# Patient Record
Sex: MALE | Race: WHITE | NOT HISPANIC OR LATINO | Employment: FULL TIME | URBAN - NONMETROPOLITAN AREA
[De-identification: names, ages, dates, MRNs, and addresses within clinical notes are randomized per-mention and may not be internally consistent; named-entity substitution may affect disease eponyms.]

---

## 2017-01-05 ENCOUNTER — TELEPHONE (OUTPATIENT)
Dept: INTERNAL MEDICINE | Facility: CLINIC | Age: 37
End: 2017-01-05

## 2017-01-05 RX ORDER — QUETIAPINE FUMARATE 25 MG/1
25 TABLET, FILM COATED ORAL NIGHTLY
Qty: 30 TABLET | Refills: 0 | Status: SHIPPED | OUTPATIENT
Start: 2017-01-05 | End: 2017-05-09 | Stop reason: SINTOL

## 2017-01-05 NOTE — TELEPHONE ENCOUNTER
----- Message from Shawna Holly MD sent at 1/5/2017  9:25 AM EST -----  Sent to [facility].  ----- Message -----     From: Lyn Truong MA     Sent: 1/4/2017   1:39 PM       To: Shawna Holly MD        ----- Message -----     From: Lucretia Shaw     Sent: 1/4/2017   1:04 PM       To: Lyn Truong MA    PATIENT WIFE CALLED AND STATED THAT THE TRAZODONE IS NOT WORKING AND WOULD LIKE SOMETHING ELSE CALLED IN.     [phone]

## 2017-04-19 ENCOUNTER — APPOINTMENT (OUTPATIENT)
Dept: GENERAL RADIOLOGY | Facility: HOSPITAL | Age: 37
End: 2017-04-19

## 2017-04-19 ENCOUNTER — HOSPITAL ENCOUNTER (OUTPATIENT)
Facility: HOSPITAL | Age: 37
Setting detail: OBSERVATION
Discharge: HOME | End: 2017-04-21
Attending: EMERGENCY MEDICINE | Admitting: INTERNAL MEDICINE

## 2017-04-19 DIAGNOSIS — E78.2 MIXED HYPERLIPIDEMIA: ICD-10-CM

## 2017-04-19 DIAGNOSIS — E78.5 HYPERLIPIDEMIA LDL GOAL <70: ICD-10-CM

## 2017-04-19 DIAGNOSIS — I25.110 CORONARY ARTERY DISEASE INVOLVING NATIVE CORONARY ARTERY OF NATIVE HEART WITH UNSTABLE ANGINA PECTORIS (HCC): Primary | ICD-10-CM

## 2017-04-19 DIAGNOSIS — R07.9 CHEST PAIN, UNSPECIFIED TYPE: ICD-10-CM

## 2017-04-19 DIAGNOSIS — E66.01 MORBID OBESITY, UNSPECIFIED OBESITY TYPE (HCC): ICD-10-CM

## 2017-04-19 DIAGNOSIS — Z72.0 TOBACCO ABUSE: ICD-10-CM

## 2017-04-19 LAB
ACT BLD: 363 SECONDS (ref 82–152)
ACT BLD: 363 SECONDS (ref 82–152)
ALBUMIN SERPL-MCNC: 4.7 G/DL (ref 3.2–4.8)
ALBUMIN/GLOB SERPL: 1.3 G/DL (ref 1.5–2.5)
ALP SERPL-CCNC: 54 U/L (ref 25–100)
ALT SERPL W P-5'-P-CCNC: 19 U/L (ref 7–40)
ANION GAP SERPL CALCULATED.3IONS-SCNC: 12 MMOL/L (ref 3–11)
ARTICHOKE IGE QN: 89 MG/DL (ref 0–130)
AST SERPL-CCNC: 26 U/L (ref 0–33)
BASOPHILS # BLD AUTO: 0.01 10*3/MM3 (ref 0–0.2)
BASOPHILS NFR BLD AUTO: 0.1 % (ref 0–1)
BILIRUB SERPL-MCNC: 0.7 MG/DL (ref 0.3–1.2)
BNP SERPL-MCNC: 146 PG/ML (ref 0–100)
BUN BLD-MCNC: 19 MG/DL (ref 9–23)
BUN/CREAT SERPL: 27.1 (ref 7–25)
CALCIUM SPEC-SCNC: 10.7 MG/DL (ref 8.7–10.4)
CHLORIDE SERPL-SCNC: 102 MMOL/L (ref 99–109)
CHOLEST SERPL-MCNC: 150 MG/DL (ref 0–200)
CO2 SERPL-SCNC: 29 MMOL/L (ref 20–31)
CREAT BLD-MCNC: 0.7 MG/DL (ref 0.6–1.3)
DEPRECATED RDW RBC AUTO: 43.4 FL (ref 37–54)
EOSINOPHIL # BLD AUTO: 0 10*3/MM3 (ref 0.1–0.3)
EOSINOPHIL NFR BLD AUTO: 0 % (ref 0–3)
ERYTHROCYTE [DISTWIDTH] IN BLOOD BY AUTOMATED COUNT: 13.3 % (ref 11.3–14.5)
GFR SERPL CREATININE-BSD FRML MDRD: 128 ML/MIN/1.73
GLOBULIN UR ELPH-MCNC: 3.5 GM/DL
GLUCOSE BLD-MCNC: 120 MG/DL (ref 70–100)
HBA1C MFR BLD: 5.4 % (ref 4.8–5.6)
HCT VFR BLD AUTO: 46.1 % (ref 38.9–50.9)
HDLC SERPL-MCNC: 35 MG/DL (ref 40–60)
HGB BLD-MCNC: 15.8 G/DL (ref 13.1–17.5)
HOLD SPECIMEN: NORMAL
HOLD SPECIMEN: NORMAL
IMM GRANULOCYTES # BLD: 0.04 10*3/MM3 (ref 0–0.03)
IMM GRANULOCYTES NFR BLD: 0.3 % (ref 0–0.6)
LIPASE SERPL-CCNC: 33 U/L (ref 6–51)
LYMPHOCYTES # BLD AUTO: 1.86 10*3/MM3 (ref 0.6–4.8)
LYMPHOCYTES NFR BLD AUTO: 12.6 % (ref 24–44)
MCH RBC QN AUTO: 30.7 PG (ref 27–31)
MCHC RBC AUTO-ENTMCNC: 34.3 G/DL (ref 32–36)
MCV RBC AUTO: 89.7 FL (ref 80–99)
MONOCYTES # BLD AUTO: 0.95 10*3/MM3 (ref 0–1)
MONOCYTES NFR BLD AUTO: 6.4 % (ref 0–12)
NEUTROPHILS # BLD AUTO: 11.95 10*3/MM3 (ref 1.5–8.3)
NEUTROPHILS NFR BLD AUTO: 80.6 % (ref 41–71)
PLATELET # BLD AUTO: 266 10*3/MM3 (ref 150–450)
PMV BLD AUTO: 10.1 FL (ref 6–12)
POTASSIUM BLD-SCNC: 4.1 MMOL/L (ref 3.5–5.5)
PROT SERPL-MCNC: 8.2 G/DL (ref 5.7–8.2)
RBC # BLD AUTO: 5.14 10*6/MM3 (ref 4.2–5.76)
SODIUM BLD-SCNC: 143 MMOL/L (ref 132–146)
TRIGL SERPL-MCNC: 119 MG/DL (ref 0–150)
TROPONIN I SERPL-MCNC: 0.01 NG/ML (ref 0–0.07)
TROPONIN I SERPL-MCNC: 0.03 NG/ML (ref 0–0.07)
WBC NRBC COR # BLD: 14.81 10*3/MM3 (ref 3.5–10.8)
WHOLE BLOOD HOLD SPECIMEN: NORMAL

## 2017-04-19 PROCEDURE — C1887 CATHETER, GUIDING: HCPCS | Performed by: INTERNAL MEDICINE

## 2017-04-19 PROCEDURE — 25010000002 FENTANYL CITRATE (PF) 100 MCG/2ML SOLUTION: Performed by: INTERNAL MEDICINE

## 2017-04-19 PROCEDURE — C1769 GUIDE WIRE: HCPCS | Performed by: INTERNAL MEDICINE

## 2017-04-19 PROCEDURE — 93458 L HRT ARTERY/VENTRICLE ANGIO: CPT | Performed by: INTERNAL MEDICINE

## 2017-04-19 PROCEDURE — 99220 PR INITIAL OBSERVATION CARE/DAY 70 MINUTES: CPT | Performed by: INTERNAL MEDICINE

## 2017-04-19 PROCEDURE — 25010000002 MORPHINE PER 10 MG: Performed by: PHYSICIAN ASSISTANT

## 2017-04-19 PROCEDURE — 0 IOPAMIDOL PER 1 ML: Performed by: INTERNAL MEDICINE

## 2017-04-19 PROCEDURE — 80061 LIPID PANEL: CPT | Performed by: PHYSICIAN ASSISTANT

## 2017-04-19 PROCEDURE — 25010000002 MIDAZOLAM PER 1 MG: Performed by: INTERNAL MEDICINE

## 2017-04-19 PROCEDURE — 71010 HC CHEST PA OR AP: CPT

## 2017-04-19 PROCEDURE — 83036 HEMOGLOBIN GLYCOSYLATED A1C: CPT | Performed by: EMERGENCY MEDICINE

## 2017-04-19 PROCEDURE — C1753 CATH, INTRAVAS ULTRASOUND: HCPCS | Performed by: INTERNAL MEDICINE

## 2017-04-19 PROCEDURE — 99284 EMERGENCY DEPT VISIT MOD MDM: CPT

## 2017-04-19 PROCEDURE — G0378 HOSPITAL OBSERVATION PER HR: HCPCS

## 2017-04-19 PROCEDURE — C1894 INTRO/SHEATH, NON-LASER: HCPCS | Performed by: INTERNAL MEDICINE

## 2017-04-19 PROCEDURE — 25010000002 ONDANSETRON PER 1 MG: Performed by: EMERGENCY MEDICINE

## 2017-04-19 PROCEDURE — 25010000002 HEPARIN (PORCINE) PER 1000 UNITS: Performed by: INTERNAL MEDICINE

## 2017-04-19 PROCEDURE — 84484 ASSAY OF TROPONIN QUANT: CPT

## 2017-04-19 PROCEDURE — 92978 ENDOLUMINL IVUS OCT C 1ST: CPT | Performed by: INTERNAL MEDICINE

## 2017-04-19 PROCEDURE — 85025 COMPLETE CBC W/AUTO DIFF WBC: CPT | Performed by: EMERGENCY MEDICINE

## 2017-04-19 PROCEDURE — 85347 COAGULATION TIME ACTIVATED: CPT

## 2017-04-19 PROCEDURE — 80053 COMPREHEN METABOLIC PANEL: CPT | Performed by: EMERGENCY MEDICINE

## 2017-04-19 PROCEDURE — 83880 ASSAY OF NATRIURETIC PEPTIDE: CPT | Performed by: EMERGENCY MEDICINE

## 2017-04-19 PROCEDURE — 93005 ELECTROCARDIOGRAM TRACING: CPT | Performed by: INTERNAL MEDICINE

## 2017-04-19 PROCEDURE — 25010000002 MORPHINE PER 10 MG: Performed by: EMERGENCY MEDICINE

## 2017-04-19 PROCEDURE — 93005 ELECTROCARDIOGRAM TRACING: CPT | Performed by: EMERGENCY MEDICINE

## 2017-04-19 PROCEDURE — 96374 THER/PROPH/DIAG INJ IV PUSH: CPT

## 2017-04-19 PROCEDURE — 83690 ASSAY OF LIPASE: CPT | Performed by: EMERGENCY MEDICINE

## 2017-04-19 RX ORDER — ASPIRIN 81 MG/1
81 TABLET ORAL DAILY
Status: DISCONTINUED | OUTPATIENT
Start: 2017-04-20 | End: 2017-04-19 | Stop reason: SDUPTHER

## 2017-04-19 RX ORDER — PANTOPRAZOLE SODIUM 40 MG/1
40 TABLET, DELAYED RELEASE ORAL
Status: DISCONTINUED | OUTPATIENT
Start: 2017-04-20 | End: 2017-04-21 | Stop reason: HOSPADM

## 2017-04-19 RX ORDER — NITROGLYCERIN 5 MG/ML
INJECTION, SOLUTION INTRAVENOUS AS NEEDED
Status: DISCONTINUED | OUTPATIENT
Start: 2017-04-19 | End: 2017-04-19 | Stop reason: HOSPADM

## 2017-04-19 RX ORDER — MAGNESIUM HYDROXIDE/ALUMINUM HYDROXICE/SIMETHICONE 120; 1200; 1200 MG/30ML; MG/30ML; MG/30ML
30 SUSPENSION ORAL ONCE
Status: COMPLETED | OUTPATIENT
Start: 2017-04-19 | End: 2017-04-19

## 2017-04-19 RX ORDER — HYDROCODONE BITARTRATE AND ACETAMINOPHEN 7.5; 325 MG/1; MG/1
1 TABLET ORAL EVERY 4 HOURS PRN
Status: DISCONTINUED | OUTPATIENT
Start: 2017-04-19 | End: 2017-04-21 | Stop reason: HOSPADM

## 2017-04-19 RX ORDER — ONDANSETRON 2 MG/ML
4 INJECTION INTRAMUSCULAR; INTRAVENOUS ONCE
Status: COMPLETED | OUTPATIENT
Start: 2017-04-19 | End: 2017-04-19

## 2017-04-19 RX ORDER — MORPHINE SULFATE 4 MG/ML
4 INJECTION, SOLUTION INTRAMUSCULAR; INTRAVENOUS ONCE
Status: COMPLETED | OUTPATIENT
Start: 2017-04-19 | End: 2017-04-19

## 2017-04-19 RX ORDER — PRASUGREL 10 MG/1
10 TABLET, FILM COATED ORAL DAILY
Status: DISCONTINUED | OUTPATIENT
Start: 2017-04-20 | End: 2017-04-19 | Stop reason: SDUPTHER

## 2017-04-19 RX ORDER — SODIUM CHLORIDE 0.9 % (FLUSH) 0.9 %
10 SYRINGE (ML) INJECTION AS NEEDED
Status: DISCONTINUED | OUTPATIENT
Start: 2017-04-19 | End: 2017-04-21 | Stop reason: HOSPADM

## 2017-04-19 RX ORDER — LIDOCAINE HYDROCHLORIDE 10 MG/ML
INJECTION, SOLUTION INFILTRATION; PERINEURAL AS NEEDED
Status: DISCONTINUED | OUTPATIENT
Start: 2017-04-19 | End: 2017-04-19 | Stop reason: HOSPADM

## 2017-04-19 RX ORDER — PRASUGREL 10 MG/1
10 TABLET, FILM COATED ORAL DAILY
Status: DISCONTINUED | OUTPATIENT
Start: 2017-04-19 | End: 2017-04-21 | Stop reason: HOSPADM

## 2017-04-19 RX ORDER — ASPIRIN 81 MG/1
324 TABLET, CHEWABLE ORAL ONCE
Status: DISCONTINUED | OUTPATIENT
Start: 2017-04-19 | End: 2017-04-19

## 2017-04-19 RX ORDER — NITROGLYCERIN 0.4 MG/1
0.4 TABLET SUBLINGUAL
Status: DISCONTINUED | OUTPATIENT
Start: 2017-04-19 | End: 2017-04-21 | Stop reason: HOSPADM

## 2017-04-19 RX ORDER — HEPARIN SODIUM 1000 [USP'U]/ML
INJECTION, SOLUTION INTRAVENOUS; SUBCUTANEOUS AS NEEDED
Status: DISCONTINUED | OUTPATIENT
Start: 2017-04-19 | End: 2017-04-19 | Stop reason: HOSPADM

## 2017-04-19 RX ORDER — SODIUM CHLORIDE 9 MG/ML
1.5 INJECTION, SOLUTION INTRAVENOUS CONTINUOUS
Status: ACTIVE | OUTPATIENT
Start: 2017-04-19 | End: 2017-04-19

## 2017-04-19 RX ORDER — METOPROLOL SUCCINATE 25 MG/1
25 TABLET, EXTENDED RELEASE ORAL
Status: DISCONTINUED | OUTPATIENT
Start: 2017-04-19 | End: 2017-04-21 | Stop reason: HOSPADM

## 2017-04-19 RX ORDER — NICARDIPINE HYDROCHLORIDE 2.5 MG/ML
INJECTION INTRAVENOUS AS NEEDED
Status: DISCONTINUED | OUTPATIENT
Start: 2017-04-19 | End: 2017-04-19 | Stop reason: HOSPADM

## 2017-04-19 RX ORDER — ATORVASTATIN CALCIUM 40 MG/1
80 TABLET, FILM COATED ORAL NIGHTLY
Status: DISCONTINUED | OUTPATIENT
Start: 2017-04-19 | End: 2017-04-21 | Stop reason: HOSPADM

## 2017-04-19 RX ORDER — FENTANYL CITRATE 50 UG/ML
INJECTION, SOLUTION INTRAMUSCULAR; INTRAVENOUS AS NEEDED
Status: DISCONTINUED | OUTPATIENT
Start: 2017-04-19 | End: 2017-04-19 | Stop reason: HOSPADM

## 2017-04-19 RX ORDER — QUETIAPINE FUMARATE 25 MG/1
25 TABLET, FILM COATED ORAL NIGHTLY
Status: DISCONTINUED | OUTPATIENT
Start: 2017-04-19 | End: 2017-04-21 | Stop reason: HOSPADM

## 2017-04-19 RX ORDER — TIZANIDINE 4 MG/1
4 TABLET ORAL EVERY 8 HOURS PRN
Status: DISCONTINUED | OUTPATIENT
Start: 2017-04-19 | End: 2017-04-21 | Stop reason: HOSPADM

## 2017-04-19 RX ORDER — GABAPENTIN 300 MG/1
300 CAPSULE ORAL EVERY 8 HOURS SCHEDULED
Status: DISCONTINUED | OUTPATIENT
Start: 2017-04-19 | End: 2017-04-21 | Stop reason: HOSPADM

## 2017-04-19 RX ORDER — MIDAZOLAM HYDROCHLORIDE 1 MG/ML
INJECTION INTRAMUSCULAR; INTRAVENOUS AS NEEDED
Status: DISCONTINUED | OUTPATIENT
Start: 2017-04-19 | End: 2017-04-19 | Stop reason: HOSPADM

## 2017-04-19 RX ORDER — ASPIRIN 81 MG/1
81 TABLET ORAL DAILY
Status: DISCONTINUED | OUTPATIENT
Start: 2017-04-20 | End: 2017-04-21 | Stop reason: HOSPADM

## 2017-04-19 RX ORDER — LISINOPRIL 2.5 MG/1
2.5 TABLET ORAL DAILY
Status: DISCONTINUED | OUTPATIENT
Start: 2017-04-19 | End: 2017-04-21 | Stop reason: HOSPADM

## 2017-04-19 RX ADMIN — TIZANIDINE 4 MG: 4 TABLET ORAL at 21:37

## 2017-04-19 RX ADMIN — GABAPENTIN 300 MG: 300 CAPSULE ORAL at 17:49

## 2017-04-19 RX ADMIN — MORPHINE SULFATE 4 MG: 4 INJECTION, SOLUTION INTRAMUSCULAR; INTRAVENOUS at 13:00

## 2017-04-19 RX ADMIN — METOPROLOL SUCCINATE 25 MG: 25 TABLET, EXTENDED RELEASE ORAL at 17:49

## 2017-04-19 RX ADMIN — ALUMINUM HYDROXIDE, MAGNESIUM HYDROXIDE, AND SIMETHICONE 30 ML: 200; 200; 20 SUSPENSION ORAL at 12:16

## 2017-04-19 RX ADMIN — LIDOCAINE HYDROCHLORIDE 15 ML: 20 SOLUTION ORAL; TOPICAL at 12:16

## 2017-04-19 RX ADMIN — ONDANSETRON 4 MG: 2 INJECTION INTRAMUSCULAR; INTRAVENOUS at 12:16

## 2017-04-19 RX ADMIN — SODIUM CHLORIDE 1.5 ML/KG/HR: 9 INJECTION, SOLUTION INTRAVENOUS at 16:35

## 2017-04-19 RX ADMIN — HYDROCODONE BITARTRATE AND ACETAMINOPHEN 1 TABLET: 7.5; 325 TABLET ORAL at 21:37

## 2017-04-19 RX ADMIN — PRASUGREL HYDROCHLORIDE 10 MG: 10 TABLET, FILM COATED ORAL at 14:27

## 2017-04-19 RX ADMIN — QUETIAPINE FUMARATE 25 MG: 25 TABLET, FILM COATED ORAL at 21:36

## 2017-04-19 RX ADMIN — NITROGLYCERIN 0.4 MG: 0.4 TABLET SUBLINGUAL at 12:45

## 2017-04-19 RX ADMIN — ASPIRIN 81 MG: 81 TABLET, COATED ORAL at 14:28

## 2017-04-19 RX ADMIN — HYDROCODONE BITARTRATE AND ACETAMINOPHEN 1 TABLET: 7.5; 325 TABLET ORAL at 17:50

## 2017-04-19 RX ADMIN — LISINOPRIL 2.5 MG: 2.5 TABLET ORAL at 17:49

## 2017-04-19 RX ADMIN — MORPHINE SULFATE 4 MG: 4 INJECTION, SOLUTION INTRAMUSCULAR; INTRAVENOUS at 21:55

## 2017-04-19 RX ADMIN — ATORVASTATIN CALCIUM 80 MG: 40 TABLET, FILM COATED ORAL at 21:36

## 2017-04-19 RX ADMIN — GABAPENTIN 300 MG: 300 CAPSULE ORAL at 21:37

## 2017-04-19 RX ADMIN — NITROGLYCERIN 0.4 MG: 0.4 TABLET SUBLINGUAL at 12:16

## 2017-04-19 NOTE — ED PROVIDER NOTES
Subjective   HPI Comments: PT WITH HX OF CAD C/O ANTERIOR CHEST PAIN LIKE PAIN HE HAS HAD WITH HIS PAST MI. PT DENIES CHILLS, FEVER, TRAUMA OR DIARRHEA. PT HAS HAD SOME VOMITING.     Patient is a 36 y.o. male presenting with chest pain.   History provided by:  Patient  Chest Pain   Pain quality: dull    Pain radiates to:  Neck and L jaw  Pain severity:  Moderate  Onset quality:  Sudden  Timing:  Constant  Chronicity:  New  Context: not breathing    Relieved by:  Nothing  Worsened by:  Nothing  Ineffective treatments:  None tried  Associated symptoms: no abdominal pain, no headache and no palpitations        Review of Systems   Cardiovascular: Positive for chest pain. Negative for palpitations.   Gastrointestinal: Negative for abdominal pain.   Neurological: Negative for headaches.   All other systems reviewed and are negative.      Past Medical History:   Diagnosis Date   • Cholelithiasis    • Coronary artery disease    • Diverticulosis    • Elevated homocysteine    • Fractures    • Gall stones    • Heart attack 06/20/2016   • Heart attack    • Kidney stone    • Kidney stones    • Myocardial infarct 06/2016    stent x 2   • Obesity    • Shingles 2015       Allergies   Allergen Reactions   • Wasp Venom Swelling     Local swelling       Past Surgical History:   Procedure Laterality Date   • APPENDECTOMY  2007   • CARDIAC CATHETERIZATION     • CARDIAC CATHETERIZATION  06/2016    Xience Alpine stent in diagonal and LAD   • CHOLECYSTECTOMY  2005   • CHOLECYSTECTOMY  2007   • COLONOSCOPY  2014   • CORONARY STENT PLACEMENT  06/20/2016    x2   • CORONARY STENT PLACEMENT  06/20/2016    2 stents   • FINGER FUSION Left     thumb   • FINGER SURGERY Left 2005    LEFT THUMB FUSED due to dislocations   • HERNIA REPAIR  2013   • HERNIA REPAIR  2014   • KIDNEY STONE SURGERY      STONES REMOVED 2006, 2007, 2008       Family History   Problem Relation Age of Onset   • Arthritis Mother    • Heart attack Mother      30s   • Heart attack  Father      30s   • Hypertension Father    • Heart attack Brother      30s   • Cancer Maternal Aunt    • Cancer Maternal Uncle    • Cancer Paternal Uncle    • Diabetes Mother    • Diabetes Father        Social History     Social History   • Marital status:      Spouse name: N/A   • Number of children: N/A   • Years of education: N/A     Social History Main Topics   • Smoking status: Former Smoker     Packs/day: 1.00     Years: 17.00     Types: Cigarettes     Quit date: 7/15/2016   • Smokeless tobacco: None   • Alcohol use Yes      Comment: OCCASIONAL   • Drug use: No   • Sexual activity: Not Asked     Other Topics Concern   • None     Social History Narrative    ** Merged History Encounter **                Objective   Physical Exam   Constitutional: He appears well-developed and well-nourished.   HENT:   Head: Normocephalic and atraumatic.   Eyes: Conjunctivae are normal.   Neck: Normal range of motion. Neck supple.   Cardiovascular: Normal rate, regular rhythm and normal heart sounds.    Pulmonary/Chest: Effort normal and breath sounds normal. No respiratory distress.   Abdominal: Soft. Bowel sounds are normal. He exhibits no distension.   Musculoskeletal: Normal range of motion. He exhibits no edema.   Neurological: He is alert.   Psychiatric: He has a normal mood and affect. His behavior is normal. Judgment and thought content normal.   Nursing note and vitals reviewed.      Procedures         ED Course  ED Course   Comment By Time   SPOKE WITH HOPE AND CONSULT PLACED IN. NEERAJ Browne 04/19 1253   DR. FOX WILL TAKE PT TO CATH LAB AND HAS SEEN THE PT. NEERAJ Browne 04/19 1432          Recent Results (from the past 24 hour(s))   BNP    Collection Time: 04/19/17 10:28 AM   Result Value Ref Range    .0 (H) 0.0 - 100.0 pg/mL   Lavender Top    Collection Time: 04/19/17 10:28 AM   Result Value Ref Range    Extra Tube hold for add-on    CBC Auto Differential    Collection Time: 04/19/17 10:28 AM    Result Value Ref Range    WBC 14.81 (H) 3.50 - 10.80 10*3/mm3    RBC 5.14 4.20 - 5.76 10*6/mm3    Hemoglobin 15.8 13.1 - 17.5 g/dL    Hematocrit 46.1 38.9 - 50.9 %    MCV 89.7 80.0 - 99.0 fL    MCH 30.7 27.0 - 31.0 pg    MCHC 34.3 32.0 - 36.0 g/dL    RDW 13.3 11.3 - 14.5 %    RDW-SD 43.4 37.0 - 54.0 fl    MPV 10.1 6.0 - 12.0 fL    Platelets 266 150 - 450 10*3/mm3    Neutrophil % 80.6 (H) 41.0 - 71.0 %    Lymphocyte % 12.6 (L) 24.0 - 44.0 %    Monocyte % 6.4 0.0 - 12.0 %    Eosinophil % 0.0 0.0 - 3.0 %    Basophil % 0.1 0.0 - 1.0 %    Immature Grans % 0.3 0.0 - 0.6 %    Neutrophils, Absolute 11.95 (H) 1.50 - 8.30 10*3/mm3    Lymphocytes, Absolute 1.86 0.60 - 4.80 10*3/mm3    Monocytes, Absolute 0.95 0.00 - 1.00 10*3/mm3    Eosinophils, Absolute 0.00 (L) 0.10 - 0.30 10*3/mm3    Basophils, Absolute 0.01 0.00 - 0.20 10*3/mm3    Immature Grans, Absolute 0.04 (H) 0.00 - 0.03 10*3/mm3   Comprehensive Metabolic Panel    Collection Time: 04/19/17 10:29 AM   Result Value Ref Range    Glucose 120 (H) 70 - 100 mg/dL    BUN 19 9 - 23 mg/dL    Creatinine 0.70 0.60 - 1.30 mg/dL    Sodium 143 132 - 146 mmol/L    Potassium 4.1 3.5 - 5.5 mmol/L    Chloride 102 99 - 109 mmol/L    CO2 29.0 20.0 - 31.0 mmol/L    Calcium 10.7 (H) 8.7 - 10.4 mg/dL    Total Protein 8.2 5.7 - 8.2 g/dL    Albumin 4.70 3.20 - 4.80 g/dL    ALT (SGPT) 19 7 - 40 U/L    AST (SGOT) 26 0 - 33 U/L    Alkaline Phosphatase 54 25 - 100 U/L    Total Bilirubin 0.7 0.3 - 1.2 mg/dL    eGFR Non African Amer 128 >60 mL/min/1.73    Globulin 3.5 gm/dL    A/G Ratio 1.3 (L) 1.5 - 2.5 g/dL    BUN/Creatinine Ratio 27.1 (H) 7.0 - 25.0    Anion Gap 12.0 (H) 3.0 - 11.0 mmol/L   Lipase    Collection Time: 04/19/17 10:29 AM   Result Value Ref Range    Lipase 33 6 - 51 U/L   Green Top (Gel)    Collection Time: 04/19/17 10:29 AM   Result Value Ref Range    Extra Tube Hold for add-ons.    Gold Top - SST    Collection Time: 04/19/17 10:29 AM   Result Value Ref Range    Extra Tube Hold  for add-ons.    Lipid Panel    Collection Time: 04/19/17 10:29 AM   Result Value Ref Range    Total Cholesterol 150 0 - 200 mg/dL    Triglycerides 119 0 - 150 mg/dL    HDL Cholesterol 35 (L) 40 - 60 mg/dL    LDL Cholesterol  89 0 - 130 mg/dL   POC Troponin, Rapid    Collection Time: 04/19/17 10:32 AM   Result Value Ref Range    Troponin I 0.03 0.00 - 0.07 ng/mL   POC Troponin, Rapid    Collection Time: 04/19/17  1:17 PM   Result Value Ref Range    Troponin I 0.01 0.00 - 0.07 ng/mL     Note: In addition to lab results from this visit, the labs listed above may include labs taken at another facility or during a different encounter within the last 24 hours. Please correlate lab times with ED admission and discharge times for further clarification of the services performed during this visit.    XR Chest 1 View   Preliminary Result   Mild nonspecific basilar interstitial disease, probably   chronic. Asymmetric interstitial edema is considered less likely. No   other evidence of active chest disease.       D:  04/19/2017   E:  04/19/2017                Vitals:    04/19/17 1003 04/19/17 1145 04/19/17 1230 04/19/17 1330   BP:  125/69 129/64 112/58   BP Location:  Right arm     Patient Position:  Lying     Pulse: 84 61     Resp:  18     Temp:       TempSrc:       SpO2: 97% 98% 97% 97%   Weight:       Height:         Medications   sodium chloride 0.9 % flush 10 mL (not administered)   aspirin chewable tablet 324 mg (324 mg Oral Not Given 4/19/17 1042)   nitroglycerin (NITROSTAT) SL tablet 0.4 mg (0.4 mg Sublingual Given 4/19/17 1245)   aspirin EC tablet 81 mg (81 mg Oral Given 4/19/17 1428)   prasugrel (EFFIENT) tablet 10 mg (10 mg Oral Given 4/19/17 1427)   ondansetron (ZOFRAN) injection 4 mg (4 mg Intravenous Given 4/19/17 1216)   aluminum-magnesium hydroxide-simethicone (MAALOX/MYLANTA) suspension 30 mL (30 mL Oral Given 4/19/17 1216)   lidocaine viscous (XYLOCAINE) 2 % mouth solution 15 mL (15 mL Mouth/Throat Given  4/19/17 1216)   Morphine sulfate (PF) injection 4 mg (4 mg Intravenous Given 4/19/17 1300)     ECG/EMG Results (last 24 hours)     Procedure Component Value Units Date/Time    ECG 12 Lead [[id number]] Collected:  04/19/17 1000     Updated:  04/19/17 1048    ECG 12 Lead [[id number]] Collected:  04/19/17 1309     Updated:  04/19/17 1326              MDM    Final diagnoses:   Coronary artery disease involving native coronary artery of native heart with unstable angina pectoris   Mixed hyperlipidemia   Tobacco abuse   Morbid obesity, unspecified obesity type            NEERAJ Browne  04/19/17 1431       NEERAJ Browne  04/19/17 1433

## 2017-04-19 NOTE — ED NOTES
CALLED [facility] AGAIN. SPOKE WITH MEDICAL RECORDS, STATED THEY'VE BEEN EXPERIENCING FAX ISSUES. THEY ARE RE-FAXING RECORDS NOW. AWAITING FAX.       Sdae Flor  04/19/17 8735

## 2017-04-19 NOTE — H&P
"[facility] at [facility]  CARDIOLOGY HISTORY AND PHYSICAL NOTE    Date of Admission:4/19/2017    PCP: Shawna Holly MD  Previous Cardiologist:  Albert Childers    IDENTIFICATION: A 36 y.o. white male resident of [place], [place]            The patient is a 36-year-old gentleman with a history of precocious coronary artery disease who sustained a myocardial infarction in August 2016.  At that time, he was treated by Dr. Albert Childers in [place] with PCI.  Since this MI, the patient has had issues with refractory chest pain.  Dr. Childers repeated heart catheterization approximately one month after his MI.  It's unclear whether the patient had further PCI at that time.  Nevertheless, the patient comes in today after developing severe, sharp, left sided chest pain at approximately 4:30 AM.  The pain has been constant and has been radiating to his left jaw and left arm.  In the emergency room, the patient has ruled out for myocardial infarction with negative troponin and EKGs.  The patient continues to have severe chest pain symptoms.  The patient is desiring to establish care with a new cardiologist.    ROS: All systems have been reviewed and are negative with the exception of those mentioned in the HPI and problem list above.    The past medical/surgical, and social histories noted in Epic.                  /61  Pulse 61  Temp 98.2 °F (36.8 °C) (Oral)   Resp 18  Ht 71\" (180.3 cm)  Wt (!) 315 lb (143 kg)  SpO2 97%  BMI 43.93 kg/m2  No intake or output data in the 24 hours ending 04/19/17 1504      PHYSICAL EXAM:  Constitutional:  Obese, cooperative, in acute distress.   Head:  Normocephalic, without obvious abnormality, atraumatic.   Neck: No adenopathy, supple, trachea midline, no thyromegaly   Respiratory:   Clear to auscultation bilaterally; respirations regular, even and unlabored. No wheezes, rales or ronchi.    Cardiovascular:  Regular rhythm and normal rate, normal S1 and " S2, no            murmur, no gallop, no rub, no click.   Pulses: Peripheral pulses are present and equal bilaterally.   GI:   Soft, non-distended. Bowel sounds heard throughout.    Extremities: No edema, clubbing or cyanosis.   Skin: Skin is warm and dry. No bleeding, bruising or rash.   Neurological: Alert, oriented to time, person and place. No focal deficits.     Labs/Diagnostic Data    Results from last 7 days  Lab Units 04/19/17  1029   SODIUM mmol/L 143   POTASSIUM mmol/L 4.1   CHLORIDE mmol/L 102   TOTAL CO2 mmol/L 29.0   BUN mg/dL 19   CREATININE mg/dL 0.70   GLUCOSE mg/dL 120*   CALCIUM mg/dL 10.7*           Results from last 7 days  Lab Units 04/19/17  1028   WBC 10*3/mm3 14.81*   HEMOGLOBIN g/dL 15.8   HEMATOCRIT % 46.1   PLATELETS 10*3/mm3 266     Troponin: 0.03, 0.01  BNP: 146    EKG: NSR at 63 bpm, anterior infarct, age undetermined    Radiology Data:   CXR 4/19/17: No acute processes.  No CHF.                Hospital Problem List     * (Principal)Coronary artery disease involving native coronary artery of native heart with unstable angina pectoris    Overview Signed 4/19/2017  2:53 PM by Renny Wilson MD     · Cardiac catheterization for MI by Albert Childers (8/2016):  PCI to unknown artery  · Repeat cardiac catheterization for recurrent chest pain, 9/2016  · [facility] ED presentation with severe persistent chest pain with negative EKG and biomarkers, 4/19/17  · Cardiac catheterization (4/19/17):         Hyperlipidemia LDL goal <70    Tobacco abuse                    · Cardiac catheterization with possible PCI via the left radial approach  · Further recommendations to follow      Scribed for Brandon Wilson MD by Dilcia Akers PA-C. 4/19/2017  3:04 PM      I have evaluated and examined the patient.  He is a 36-year-old with known coronary artery disease who is a been experiencing persistent refractory chest pain since his myocardial infarction in August 2016.  He presents today with an acute  exacerbation of the chest pain and has ruled out for myocardial infarction.  However, in the emergency room he continues to be distressed.  I am recommending that the patient undergo cardiac catheterization to define his anatomy and rule out obstructive disease as a cause of symptoms.  If no obstructive disease is appreciated, patient will benefit from GI evaluation and possible upper endoscopy.    Renny Wilson MD  4/19/2017

## 2017-04-20 PROBLEM — R07.9 CHEST PAIN: Status: RESOLVED | Noted: 2017-04-19 | Resolved: 2017-04-20

## 2017-04-20 LAB
ACT BLD: 384 SECONDS (ref 82–152)
ACT BLD: 590 SECONDS (ref 82–152)

## 2017-04-20 PROCEDURE — 0 IOPAMIDOL PER 1 ML: Performed by: INTERNAL MEDICINE

## 2017-04-20 PROCEDURE — C1725 CATH, TRANSLUMIN NON-LASER: HCPCS | Performed by: INTERNAL MEDICINE

## 2017-04-20 PROCEDURE — C1760 CLOSURE DEV, VASC: HCPCS | Performed by: INTERNAL MEDICINE

## 2017-04-20 PROCEDURE — G0378 HOSPITAL OBSERVATION PER HR: HCPCS

## 2017-04-20 PROCEDURE — C1894 INTRO/SHEATH, NON-LASER: HCPCS | Performed by: INTERNAL MEDICINE

## 2017-04-20 PROCEDURE — 92978 ENDOLUMINL IVUS OCT C 1ST: CPT | Performed by: INTERNAL MEDICINE

## 2017-04-20 PROCEDURE — 92921 PR PRQ TRLUML CORONARY ANGIOPLASTY ADDL BRANCH: CPT | Performed by: INTERNAL MEDICINE

## 2017-04-20 PROCEDURE — 25010000002 MIDAZOLAM PER 1 MG: Performed by: INTERNAL MEDICINE

## 2017-04-20 PROCEDURE — 99204 OFFICE O/P NEW MOD 45 MIN: CPT | Performed by: INTERNAL MEDICINE

## 2017-04-20 PROCEDURE — 25010000002 HYDROMORPHONE PER 4 MG: Performed by: INTERNAL MEDICINE

## 2017-04-20 PROCEDURE — 25010000002 MORPHINE PER 10 MG: Performed by: INTERNAL MEDICINE

## 2017-04-20 PROCEDURE — 92921: CPT | Performed by: INTERNAL MEDICINE

## 2017-04-20 PROCEDURE — 92920 PRQ TRLUML C ANGIOP 1ART&/BR: CPT | Performed by: INTERNAL MEDICINE

## 2017-04-20 PROCEDURE — C1769 GUIDE WIRE: HCPCS | Performed by: INTERNAL MEDICINE

## 2017-04-20 PROCEDURE — 85347 COAGULATION TIME ACTIVATED: CPT

## 2017-04-20 PROCEDURE — 25010000002 HEPARIN (PORCINE) PER 1000 UNITS: Performed by: INTERNAL MEDICINE

## 2017-04-20 PROCEDURE — C1753 CATH, INTRAVAS ULTRASOUND: HCPCS | Performed by: INTERNAL MEDICINE

## 2017-04-20 PROCEDURE — 25010000002 FENTANYL CITRATE (PF) 100 MCG/2ML SOLUTION: Performed by: INTERNAL MEDICINE

## 2017-04-20 PROCEDURE — 25010000002 ONDANSETRON PER 1 MG: Performed by: NURSE PRACTITIONER

## 2017-04-20 PROCEDURE — C1887 CATHETER, GUIDING: HCPCS | Performed by: INTERNAL MEDICINE

## 2017-04-20 RX ORDER — ONDANSETRON 2 MG/ML
4 INJECTION INTRAMUSCULAR; INTRAVENOUS EVERY 6 HOURS PRN
Status: DISCONTINUED | OUTPATIENT
Start: 2017-04-20 | End: 2017-04-21 | Stop reason: HOSPADM

## 2017-04-20 RX ORDER — MORPHINE SULFATE 4 MG/ML
4 INJECTION, SOLUTION INTRAMUSCULAR; INTRAVENOUS ONCE
Status: COMPLETED | OUTPATIENT
Start: 2017-04-20 | End: 2017-04-20

## 2017-04-20 RX ORDER — HYDROMORPHONE HYDROCHLORIDE 1 MG/ML
0.5 INJECTION, SOLUTION INTRAMUSCULAR; INTRAVENOUS; SUBCUTANEOUS
Status: DISCONTINUED | OUTPATIENT
Start: 2017-04-20 | End: 2017-04-20

## 2017-04-20 RX ORDER — SODIUM CHLORIDE 9 MG/ML
1.5 INJECTION, SOLUTION INTRAVENOUS CONTINUOUS
Status: ACTIVE | OUTPATIENT
Start: 2017-04-20 | End: 2017-04-20

## 2017-04-20 RX ORDER — NITROGLYCERIN 5 MG/ML
INJECTION, SOLUTION INTRAVENOUS AS NEEDED
Status: DISCONTINUED | OUTPATIENT
Start: 2017-04-20 | End: 2017-04-20 | Stop reason: HOSPADM

## 2017-04-20 RX ORDER — HEPARIN SODIUM 1000 [USP'U]/ML
INJECTION, SOLUTION INTRAVENOUS; SUBCUTANEOUS AS NEEDED
Status: DISCONTINUED | OUTPATIENT
Start: 2017-04-20 | End: 2017-04-20 | Stop reason: HOSPADM

## 2017-04-20 RX ORDER — FENTANYL CITRATE 50 UG/ML
INJECTION, SOLUTION INTRAMUSCULAR; INTRAVENOUS AS NEEDED
Status: DISCONTINUED | OUTPATIENT
Start: 2017-04-20 | End: 2017-04-20 | Stop reason: HOSPADM

## 2017-04-20 RX ORDER — MIDAZOLAM HYDROCHLORIDE 1 MG/ML
INJECTION INTRAMUSCULAR; INTRAVENOUS AS NEEDED
Status: DISCONTINUED | OUTPATIENT
Start: 2017-04-20 | End: 2017-04-20 | Stop reason: HOSPADM

## 2017-04-20 RX ORDER — LIDOCAINE HYDROCHLORIDE 10 MG/ML
INJECTION, SOLUTION INFILTRATION; PERINEURAL AS NEEDED
Status: DISCONTINUED | OUTPATIENT
Start: 2017-04-20 | End: 2017-04-20 | Stop reason: HOSPADM

## 2017-04-20 RX ADMIN — HYDROMORPHONE HYDROCHLORIDE 1 MG: 1 INJECTION, SOLUTION INTRAMUSCULAR; INTRAVENOUS; SUBCUTANEOUS at 17:08

## 2017-04-20 RX ADMIN — HYDROMORPHONE HYDROCHLORIDE 1 MG: 1 INJECTION, SOLUTION INTRAMUSCULAR; INTRAVENOUS; SUBCUTANEOUS at 13:50

## 2017-04-20 RX ADMIN — ATORVASTATIN CALCIUM 80 MG: 40 TABLET, FILM COATED ORAL at 20:51

## 2017-04-20 RX ADMIN — GABAPENTIN 300 MG: 300 CAPSULE ORAL at 05:36

## 2017-04-20 RX ADMIN — Medication: at 13:02

## 2017-04-20 RX ADMIN — METOPROLOL SUCCINATE 25 MG: 25 TABLET, EXTENDED RELEASE ORAL at 08:04

## 2017-04-20 RX ADMIN — SODIUM CHLORIDE 1.5 ML/KG/HR: 9 INJECTION, SOLUTION INTRAVENOUS at 13:14

## 2017-04-20 RX ADMIN — PRASUGREL HYDROCHLORIDE 10 MG: 10 TABLET, FILM COATED ORAL at 08:04

## 2017-04-20 RX ADMIN — MORPHINE SULFATE 4 MG: 4 INJECTION, SOLUTION INTRAMUSCULAR; INTRAVENOUS at 02:24

## 2017-04-20 RX ADMIN — ASPIRIN 81 MG: 81 TABLET, COATED ORAL at 08:04

## 2017-04-20 RX ADMIN — ONDANSETRON 4 MG: 2 INJECTION INTRAMUSCULAR; INTRAVENOUS at 13:23

## 2017-04-20 RX ADMIN — HYDROCODONE BITARTRATE AND ACETAMINOPHEN 1 TABLET: 7.5; 325 TABLET ORAL at 05:38

## 2017-04-20 RX ADMIN — PANTOPRAZOLE SODIUM 40 MG: 40 TABLET, DELAYED RELEASE ORAL at 05:36

## 2017-04-20 RX ADMIN — QUETIAPINE FUMARATE 25 MG: 25 TABLET, FILM COATED ORAL at 20:51

## 2017-04-20 RX ADMIN — Medication: at 15:46

## 2017-04-20 RX ADMIN — HYDROMORPHONE HYDROCHLORIDE 0.5 MG: 1 INJECTION, SOLUTION INTRAMUSCULAR; INTRAVENOUS; SUBCUTANEOUS at 07:43

## 2017-04-20 RX ADMIN — HYDROCODONE BITARTRATE AND ACETAMINOPHEN 1 TABLET: 7.5; 325 TABLET ORAL at 12:36

## 2017-04-20 RX ADMIN — LISINOPRIL 2.5 MG: 2.5 TABLET ORAL at 08:04

## 2017-04-20 RX ADMIN — HYDROMORPHONE HYDROCHLORIDE 1 MG: 1 INJECTION, SOLUTION INTRAMUSCULAR; INTRAVENOUS; SUBCUTANEOUS at 20:51

## 2017-04-20 RX ADMIN — ONDANSETRON 4 MG: 2 INJECTION INTRAMUSCULAR; INTRAVENOUS at 19:27

## 2017-04-20 RX ADMIN — GABAPENTIN 300 MG: 300 CAPSULE ORAL at 21:57

## 2017-04-20 NOTE — PLAN OF CARE
Problem: Patient Care Overview (Adult)  Goal: Plan of Care Review  Outcome: Ongoing (interventions implemented as appropriate)    04/20/17 1417   Coping/Psychosocial Response Interventions   Plan Of Care Reviewed With patient;spouse   Patient Care Overview   Progress no change   Outcome Evaluation   Outcome Summary/Follow up Plan Pt came to floor from [facility], right groin site has small ooze, dressing changed. Ice pack applied. Pt complaining of back pain, no chest pain. NSR on tele, VSS. Will continue to monitor overnight.

## 2017-04-20 NOTE — PROGRESS NOTES
"[facility] at [facility]  IP Progress Note      Chief Complaint/Reason for service:    · Unstable Angina        The patient continued to have severe chest pain overnight following diagnostic angiography yesterday.  The patient was taken to the lab earlier this morning for staged balloon angioplasty of the LAD and diagonal stents.  Since returning to the floor after his procedure, the patient's chest discomfort has improved.  He is having some soreness in the right groin access site.    Past medical, surgical, social and family history reviewed in the patient's electronic medical record.           Vital Sign Min/Max for last 24 hours  Temp  Min: 97.3 °F (36.3 °C)  Max: 98.4 °F (36.9 °C)   BP  Min: 115/66  Max: 160/104   Pulse  Min: 55  Max: 77   Resp  Min: 16  Max: 18   SpO2  Min: 94 %  Max: 99 %   Flow (L/min)  Min: 4  Max: 4    No intake or output data in the 24 hours ending 04/20/17 2119        Physical Exam   Constitutional: He appears well-developed.   Morbidly obese   Cardiovascular: Regular rhythm.    Pulmonary/Chest: Effort normal and breath sounds normal.   Abdominal: Soft.   Neurological: He is alert.   Skin: Skin is warm and dry.       Results Review:   I reviewed the patient's recent labs in the electronic medical record.      EKG:   NSR with Sinus Arrythmia    Tele:  NSR          Hospital Problem List     * (Principal)Coronary artery disease involving native coronary artery of native heart with unstable angina pectoris    Overview Addendum 4/20/2017  9:17 PM by Renny Wilson MD     · Cardiac catheterization for inferior lateral STEMI (6/20/16): 100% occlusion of the proximal LAD status post bifurcation stenting of the LAD/diagonal branch using a 2.5 x 18 mm stent in the LAD and a 3 x 15 mm stent in the diagonal branch.  · \"Relook\" cardiac catheterization recurrent chest pain by Albert Childers (6/20/2016):  Patent stents in LAD and diagonal branch.  No obstructive disease " PCI to unknown artery  ·  L ED presentation with severe persistent chest pain with negative EKG and biomarkers, 4/19/17  · Cardiac catheterization for unstable angina (4/19/17): Patent LAD/diagonal bifurcation stents.  OCT of the diagonal showed excellent stent apposition.  OCT of the LAD desired, but LAD develops MAYRA 1 flow with wire across lesion.  · Staged kissing balloon angioplasty to the LAD/diagonal branch, 4/20/17         Hyperlipidemia LDL goal <70    Overview Signed 4/19/2017  4:26 PM by Renny Wilson MD     · High intensity statin therapy indicated         Tobacco abuse                 · Continue to monitor overnight.  · Hopefully home tomorrow on same medications.    Renny Wilson MD  4/20/2017

## 2017-04-21 VITALS
TEMPERATURE: 97.7 F | BODY MASS INDEX: 44.1 KG/M2 | RESPIRATION RATE: 20 BRPM | OXYGEN SATURATION: 97 % | DIASTOLIC BLOOD PRESSURE: 80 MMHG | HEIGHT: 71 IN | HEART RATE: 67 BPM | WEIGHT: 315 LBS | SYSTOLIC BLOOD PRESSURE: 147 MMHG

## 2017-04-21 LAB
ANION GAP SERPL CALCULATED.3IONS-SCNC: 4 MMOL/L (ref 3–11)
BUN BLD-MCNC: 15 MG/DL (ref 9–23)
BUN/CREAT SERPL: 25 (ref 7–25)
CALCIUM SPEC-SCNC: 9.5 MG/DL (ref 8.7–10.4)
CHLORIDE SERPL-SCNC: 102 MMOL/L (ref 99–109)
CO2 SERPL-SCNC: 29 MMOL/L (ref 20–31)
CREAT BLD-MCNC: 0.6 MG/DL (ref 0.6–1.3)
DEPRECATED RDW RBC AUTO: 44.1 FL (ref 37–54)
ERYTHROCYTE [DISTWIDTH] IN BLOOD BY AUTOMATED COUNT: 13.1 % (ref 11.3–14.5)
GFR SERPL CREATININE-BSD FRML MDRD: >150 ML/MIN/1.73
GLUCOSE BLD-MCNC: 99 MG/DL (ref 70–100)
HCT VFR BLD AUTO: 47.3 % (ref 38.9–50.9)
HGB BLD-MCNC: 15.6 G/DL (ref 13.1–17.5)
MCH RBC QN AUTO: 30.3 PG (ref 27–31)
MCHC RBC AUTO-ENTMCNC: 33 G/DL (ref 32–36)
MCV RBC AUTO: 91.8 FL (ref 80–99)
PLATELET # BLD AUTO: 231 10*3/MM3 (ref 150–450)
PMV BLD AUTO: 10.2 FL (ref 6–12)
POTASSIUM BLD-SCNC: 3.9 MMOL/L (ref 3.5–5.5)
RBC # BLD AUTO: 5.15 10*6/MM3 (ref 4.2–5.76)
SODIUM BLD-SCNC: 135 MMOL/L (ref 132–146)
WBC NRBC COR # BLD: 11.47 10*3/MM3 (ref 3.5–10.8)

## 2017-04-21 PROCEDURE — 99217 PR OBSERVATION CARE DISCHARGE MANAGEMENT: CPT | Performed by: INTERNAL MEDICINE

## 2017-04-21 PROCEDURE — 25010000002 HYDROMORPHONE PER 4 MG: Performed by: INTERNAL MEDICINE

## 2017-04-21 PROCEDURE — 80048 BASIC METABOLIC PNL TOTAL CA: CPT | Performed by: INTERNAL MEDICINE

## 2017-04-21 PROCEDURE — 85027 COMPLETE CBC AUTOMATED: CPT | Performed by: INTERNAL MEDICINE

## 2017-04-21 PROCEDURE — 93005 ELECTROCARDIOGRAM TRACING: CPT | Performed by: INTERNAL MEDICINE

## 2017-04-21 PROCEDURE — G0378 HOSPITAL OBSERVATION PER HR: HCPCS

## 2017-04-21 RX ORDER — LISINOPRIL 2.5 MG/1
2.5 TABLET ORAL DAILY
Qty: 90 TABLET | Refills: 3 | Status: SHIPPED | OUTPATIENT
Start: 2017-04-21 | End: 2017-10-19 | Stop reason: SDUPTHER

## 2017-04-21 RX ORDER — PRASUGREL 10 MG/1
10 TABLET, FILM COATED ORAL DAILY
Qty: 90 TABLET | Refills: 3 | Status: SHIPPED | OUTPATIENT
Start: 2017-04-21 | End: 2017-10-19 | Stop reason: SDUPTHER

## 2017-04-21 RX ORDER — ASPIRIN 81 MG/1
81 TABLET ORAL DAILY
Qty: 90 TABLET | Refills: 3 | Status: SHIPPED | OUTPATIENT
Start: 2017-04-21 | End: 2018-04-16

## 2017-04-21 RX ORDER — METOPROLOL SUCCINATE 25 MG/1
25 TABLET, EXTENDED RELEASE ORAL
Qty: 90 TABLET | Refills: 3 | Status: SHIPPED | OUTPATIENT
Start: 2017-04-21 | End: 2017-10-19 | Stop reason: SDUPTHER

## 2017-04-21 RX ORDER — ATORVASTATIN CALCIUM 40 MG/1
40 TABLET, FILM COATED ORAL DAILY
Qty: 90 TABLET | Refills: 3 | Status: SHIPPED | OUTPATIENT
Start: 2017-04-21 | End: 2017-10-19 | Stop reason: SDUPTHER

## 2017-04-21 RX ORDER — NITROGLYCERIN 0.4 MG/1
0.4 TABLET SUBLINGUAL
Qty: 100 TABLET | Refills: 12 | Status: SHIPPED | OUTPATIENT
Start: 2017-04-21

## 2017-04-21 RX ADMIN — HYDROMORPHONE HYDROCHLORIDE 1 MG: 1 INJECTION, SOLUTION INTRAMUSCULAR; INTRAVENOUS; SUBCUTANEOUS at 04:27

## 2017-04-21 RX ADMIN — HYDROMORPHONE HYDROCHLORIDE 1 MG: 1 INJECTION, SOLUTION INTRAMUSCULAR; INTRAVENOUS; SUBCUTANEOUS at 00:44

## 2017-04-21 RX ADMIN — ASPIRIN 81 MG: 81 TABLET, COATED ORAL at 09:35

## 2017-04-21 RX ADMIN — LISINOPRIL 2.5 MG: 2.5 TABLET ORAL at 09:35

## 2017-04-21 RX ADMIN — METOPROLOL SUCCINATE 25 MG: 25 TABLET, EXTENDED RELEASE ORAL at 09:35

## 2017-04-21 RX ADMIN — HYDROCODONE BITARTRATE AND ACETAMINOPHEN 1 TABLET: 7.5; 325 TABLET ORAL at 09:42

## 2017-04-21 RX ADMIN — GABAPENTIN 300 MG: 300 CAPSULE ORAL at 04:32

## 2017-04-21 RX ADMIN — PRASUGREL HYDROCHLORIDE 10 MG: 10 TABLET, FILM COATED ORAL at 09:35

## 2017-04-21 RX ADMIN — PANTOPRAZOLE SODIUM 40 MG: 40 TABLET, DELAYED RELEASE ORAL at 04:33

## 2017-04-21 NOTE — PROGRESS NOTES
Discharge Planning Assessment  [facility]     Patient Name: Kenneth Aguilar  MRN: [id number]  Today's Date: 4/21/2017    Admit Date: 4/19/2017          Discharge Needs Assessment     None            Discharge Plan       04/21/17 1108    Case Management/Social Work Plan    Plan Home    Patient/Family In Agreement With Plan unable to assess    Additional Comments In to see pt and he has already been d/c'd.         Discharge Placement     No information found        Expected Discharge Date and Time     Expected Discharge Date Expected Discharge Time    Apr 21, 2017               Demographic Summary     None            Functional Status     None            Psychosocial     None            Abuse/Neglect     None            Legal     None            Substance Abuse     None            Patient Forms     None          Beverly Crawford

## 2017-04-21 NOTE — DISCHARGE SUMMARY
"  Date of Discharge:  4/21/2017    Hospital Problem List     * (Principal)Coronary artery disease involving native coronary artery of native heart with unstable angina pectoris    Overview Addendum 4/20/2017  9:17 PM by Renny Wilson MD     · Cardiac catheterization for inferior lateral STEMI (6/20/16): 100% occlusion of the proximal LAD status post bifurcation stenting of the LAD/diagonal branch using a 2.5 x 18 mm stent in the LAD and a 3 x 15 mm stent in the diagonal branch.  · \"Relook\" cardiac catheterization recurrent chest pain by Albert Childers (6/20/2016):  Patent stents in LAD and diagonal branch.  No obstructive disease PCI to unknown artery  · [facility] ED presentation with severe persistent chest pain with negative EKG and biomarkers, 4/19/17  · Cardiac catheterization for unstable angina (4/19/17): Patent LAD/diagonal bifurcation stents.  OCT of the diagonal showed excellent stent apposition.  OCT of the LAD desired, but LAD develops MAYRA 1 flow with wire across lesion.  · Staged kissing balloon angioplasty to the LAD/diagonal branch, 4/20/17         Hyperlipidemia LDL goal <70    Overview Signed 4/19/2017  4:26 PM by Renny Wilson MD     · High intensity statin therapy indicated         Tobacco abuse          Hospital Course  Patient is a 36 y.o. male with a history of precocious coronary artery disease who underwent a complicated bifurcation PCI of his LAD and first diagonal branch one year ago in [place] for an acute anterior MI.  Ever since that initial procedure, the patient has had intermittent severe chest pain for which he has been evaluated on numerous occasions with negative cardiac workup.  He presented to [facility] emergency room on 4/19/17 with severe recurrent chest pain symptoms.  He ruled out for myocardial infarction with EKG and enzymes.    He underwent cardiac catheterization on 4/19/17.  Angiographically, the previously placed stents appeared patent with some " questionable narrowing proximal to the bifurcation.  I elected to performed intravascular imaging.  The images showed that the large diagonal stent was widely patent.  However, when I advanced my coronary wire across the LAD stent, flow through the previously placed stent stopped.  This resolved with removal of the wire, indicating some sort of mechanical intermittent obstruction.  The following day, I brought him back for a possible staged intervention of the LAD and diagonal branch.  Initially, I wanted to perform intravascular ultrasound of the LAD but the IVUS catheter would not cross the LAD stent.  Therefore, I proceeded with balloon angioplasty of the LAD and then kissing balloon angioplasty of both the LAD and diagonal branches.  By doing so, the patient had flow in his LAD with the wire across the stent and no angiographic evidence of stenosis.    Following this procedure, his chest pain improved.  I suspect there was some mechanical disruption of the LAD stent causing his chest pain based on his symptomatic improvement.  However, if the patient has recurrence of chest pain symptoms, I would have a low index of suspicion for noncardiac etiologies of his symptoms.    The patient will be discharged home on continued DAPT, intensified statin therapy.  I will plan on seeing the patient in my office in 6 weeks.  CMP and lipids should be performed around that time.    Procedures Performed  Procedure(s):  Angioplasty-coronary  Intravascular Ultrasound         Cath Ejection Fraction Quantitative  LVEF Normal.    Condition on Discharge:  stable    Physical Exam at Discharge    Vital Signs  Temp:  [97.3 °F (36.3 °C)-97.9 °F (36.6 °C)] 97.6 °F (36.4 °C)  Heart Rate:  [55-74] 73  Resp:  [16-18] 18  BP: (115-160)/() 133/79    Physical Exam   Constitutional: He is oriented to person, place, and time. He appears well-developed and well-nourished.   HENT:   Head: Normocephalic and atraumatic.   Eyes: No scleral  icterus.   Neck: No JVD present.   Cardiovascular: Normal rate and regular rhythm.    No murmur heard.  Right groin is tender but without hematoma or swelling   Pulmonary/Chest: Effort normal and breath sounds normal.   Abdominal: Soft.   Neurological: He is alert and oriented to person, place, and time.   Skin: Skin is warm and dry.   Psychiatric: His behavior is normal.         Discharge Disposition  Home or Self Care    Discharge Medications   Kenneth Aguilar   Home Medication Instructions DANK:[id number]    Printed on:04/21/17 5050   Medication Information                      aspirin 81 MG EC tablet  Take 1 tablet by mouth Daily for 360 days.             atorvastatin (LIPITOR) 40 MG tablet  Take 1 tablet by mouth Daily for 360 days.             furosemide (LASIX) 20 MG tablet  TAKE 2 TABS DAILY UNTIL FEELING AT BASELINE WITH REGARD TO BREATHING, THEN ONCE DAILY             gabapentin (NEURONTIN) 300 MG capsule  Take 1 capsule by mouth 3 (Three) Times a Day.             HYDROcodone-acetaminophen (NORCO) 7.5-325 MG per tablet               ibuprofen (ADVIL,MOTRIN) 400 MG tablet  Take 400 mg by mouth every 6 (six) hours as needed for mild pain (1-3).             lisinopril (PRINIVIL,ZESTRIL) 2.5 MG tablet  Take 1 tablet by mouth Daily for 360 days.             metoprolol succinate XL (TOPROL-XL) 25 MG 24 hr tablet  Take 1 tablet by mouth Daily for 360 days.             mometasone (ELOCON) 0.1 % cream  Apply  topically Daily.             nitroglycerin (NITROSTAT) 0.4 MG SL tablet  Place 1 tablet under the tongue Every 5 (Five) Minutes As Needed for Chest Pain. Take no more than 3 doses in 15 minutes.             pantoprazole (PROTONIX) 20 MG EC tablet  Take 1 tablet by mouth Daily.             potassium chloride (K-DUR,KLOR-CON) 20 MEQ CR tablet  Take 1 tablet by mouth Daily.             potassium chloride (MICRO-K) 10 MEQ CR capsule  TAKE 1 CAPSULE DAILY. IF TAKING 2 TABS LASIX, PLEASE TAKE 2 CAPSULES OF  POTASSIUM             prasugrel (EFFIENT) 10 MG tablet  Take 1 tablet by mouth Daily for 360 days.             QUEtiapine (SEROQUEL) 25 MG tablet  Take 1 tablet by mouth Every Night.             tiZANidine (ZANAFLEX) 4 MG tablet  Take 1 tablet by mouth every 8 (eight) hours as needed for muscle spasms.             varenicline (CHANTIX CONTINUING MONTH TEJAS) 1 MG tablet  Take 1 tablet by mouth 2 (Two) Times a Day.                 Discharge Diet: aha    Activity at Discharge: No heavy lifting for 2 days    Follow-up Appointments:  · Shawna Holly MD in 2 weeks  · Brandon Wilson in 6 weeks    Additional Instructions for the Follow-ups that You Need to Schedule     Comprehensive Metabolic Panel    Jun 02, 2017 (Approximate)        Lipid Panel    Jun 05, 2017 (Approximate)                   Renny Wilson MD  04/21/17  7:43 AM    Time: Discharge 35 min

## 2017-04-22 ENCOUNTER — HOSPITAL ENCOUNTER (EMERGENCY)
Facility: HOSPITAL | Age: 37
Discharge: HOME | End: 2017-04-22
Attending: EMERGENCY MEDICINE | Admitting: EMERGENCY MEDICINE

## 2017-04-22 ENCOUNTER — APPOINTMENT (OUTPATIENT)
Dept: CT IMAGING | Facility: HOSPITAL | Age: 37
End: 2017-04-22

## 2017-04-22 VITALS
SYSTOLIC BLOOD PRESSURE: 121 MMHG | HEIGHT: 71 IN | HEART RATE: 74 BPM | BODY MASS INDEX: 44.1 KG/M2 | RESPIRATION RATE: 20 BRPM | WEIGHT: 315 LBS | OXYGEN SATURATION: 97 % | DIASTOLIC BLOOD PRESSURE: 93 MMHG | TEMPERATURE: 98.9 F

## 2017-04-22 DIAGNOSIS — Z98.890 S/P LEFT HEART CATHETERIZATION BY PERCUTANEOUS APPROACH: ICD-10-CM

## 2017-04-22 DIAGNOSIS — R10.31 RIGHT INGUINAL PAIN: Primary | ICD-10-CM

## 2017-04-22 LAB
ALBUMIN SERPL-MCNC: 4.6 G/DL (ref 3.2–4.8)
ALBUMIN/GLOB SERPL: 1.4 G/DL (ref 1.5–2.5)
ALP SERPL-CCNC: 59 U/L (ref 25–100)
ALT SERPL W P-5'-P-CCNC: 30 U/L (ref 7–40)
ANION GAP SERPL CALCULATED.3IONS-SCNC: 7 MMOL/L (ref 3–11)
AST SERPL-CCNC: 42 U/L (ref 0–33)
BASOPHILS # BLD AUTO: 0.03 10*3/MM3 (ref 0–0.2)
BASOPHILS NFR BLD AUTO: 0.2 % (ref 0–1)
BILIRUB SERPL-MCNC: 1.1 MG/DL (ref 0.3–1.2)
BILIRUB UR QL STRIP: NEGATIVE
BUN BLD-MCNC: 15 MG/DL (ref 9–23)
BUN/CREAT SERPL: 21.4 (ref 7–25)
CALCIUM SPEC-SCNC: 9.9 MG/DL (ref 8.7–10.4)
CHLORIDE SERPL-SCNC: 104 MMOL/L (ref 99–109)
CLARITY UR: CLEAR
CO2 SERPL-SCNC: 24 MMOL/L (ref 20–31)
COLOR UR: ABNORMAL
CREAT BLD-MCNC: 0.7 MG/DL (ref 0.6–1.3)
DEPRECATED RDW RBC AUTO: 42.4 FL (ref 37–54)
EOSINOPHIL # BLD AUTO: 0.04 10*3/MM3 (ref 0.1–0.3)
EOSINOPHIL NFR BLD AUTO: 0.3 % (ref 0–3)
ERYTHROCYTE [DISTWIDTH] IN BLOOD BY AUTOMATED COUNT: 13 % (ref 11.3–14.5)
GFR SERPL CREATININE-BSD FRML MDRD: 128 ML/MIN/1.73
GLOBULIN UR ELPH-MCNC: 3.4 GM/DL
GLUCOSE BLD-MCNC: 109 MG/DL (ref 70–100)
GLUCOSE UR STRIP-MCNC: NEGATIVE MG/DL
HCT VFR BLD AUTO: 48.9 % (ref 38.9–50.9)
HGB BLD-MCNC: 17 G/DL (ref 13.1–17.5)
HGB UR QL STRIP.AUTO: NEGATIVE
HOLD SPECIMEN: NORMAL
IMM GRANULOCYTES # BLD: 0.03 10*3/MM3 (ref 0–0.03)
IMM GRANULOCYTES NFR BLD: 0.2 % (ref 0–0.6)
KETONES UR QL STRIP: ABNORMAL
LEUKOCYTE ESTERASE UR QL STRIP.AUTO: NEGATIVE
LYMPHOCYTES # BLD AUTO: 1.71 10*3/MM3 (ref 0.6–4.8)
LYMPHOCYTES NFR BLD AUTO: 14.1 % (ref 24–44)
MCH RBC QN AUTO: 31.4 PG (ref 27–31)
MCHC RBC AUTO-ENTMCNC: 34.8 G/DL (ref 32–36)
MCV RBC AUTO: 90.2 FL (ref 80–99)
MONOCYTES # BLD AUTO: 0.88 10*3/MM3 (ref 0–1)
MONOCYTES NFR BLD AUTO: 7.2 % (ref 0–12)
NEUTROPHILS # BLD AUTO: 9.48 10*3/MM3 (ref 1.5–8.3)
NEUTROPHILS NFR BLD AUTO: 78 % (ref 41–71)
NITRITE UR QL STRIP: NEGATIVE
PH UR STRIP.AUTO: 6 [PH] (ref 5–8)
PLATELET # BLD AUTO: 231 10*3/MM3 (ref 150–450)
PMV BLD AUTO: 10.3 FL (ref 6–12)
POTASSIUM BLD-SCNC: 5.4 MMOL/L (ref 3.5–5.5)
PROT SERPL-MCNC: 8 G/DL (ref 5.7–8.2)
PROT UR QL STRIP: NEGATIVE
RBC # BLD AUTO: 5.42 10*6/MM3 (ref 4.2–5.76)
SODIUM BLD-SCNC: 135 MMOL/L (ref 132–146)
SP GR UR STRIP: 1.05 (ref 1–1.03)
UROBILINOGEN UR QL STRIP: ABNORMAL
WBC NRBC COR # BLD: 12.17 10*3/MM3 (ref 3.5–10.8)
WHOLE BLOOD HOLD SPECIMEN: NORMAL
WHOLE BLOOD HOLD SPECIMEN: NORMAL

## 2017-04-22 PROCEDURE — 99284 EMERGENCY DEPT VISIT MOD MDM: CPT

## 2017-04-22 PROCEDURE — 0 IOPAMIDOL 61 % SOLUTION: Performed by: EMERGENCY MEDICINE

## 2017-04-22 PROCEDURE — 74177 CT ABD & PELVIS W/CONTRAST: CPT

## 2017-04-22 PROCEDURE — 80053 COMPREHEN METABOLIC PANEL: CPT | Performed by: EMERGENCY MEDICINE

## 2017-04-22 PROCEDURE — 81003 URINALYSIS AUTO W/O SCOPE: CPT | Performed by: EMERGENCY MEDICINE

## 2017-04-22 PROCEDURE — 85025 COMPLETE CBC W/AUTO DIFF WBC: CPT | Performed by: EMERGENCY MEDICINE

## 2017-04-22 RX ORDER — SODIUM CHLORIDE 0.9 % (FLUSH) 0.9 %
10 SYRINGE (ML) INJECTION AS NEEDED
Status: DISCONTINUED | OUTPATIENT
Start: 2017-04-22 | End: 2017-04-22 | Stop reason: HOSPADM

## 2017-04-22 RX ORDER — OXYCODONE AND ACETAMINOPHEN 10; 325 MG/1; MG/1
1 TABLET ORAL ONCE
Status: COMPLETED | OUTPATIENT
Start: 2017-04-22 | End: 2017-04-22

## 2017-04-22 RX ADMIN — IOPAMIDOL 85 ML: 612 INJECTION, SOLUTION INTRAVENOUS at 11:59

## 2017-04-22 RX ADMIN — OXYCODONE HYDROCHLORIDE AND ACETAMINOPHEN 1 TABLET: 10; 325 TABLET ORAL at 11:02

## 2017-04-22 RX ADMIN — OXYCODONE HYDROCHLORIDE AND ACETAMINOPHEN 1 TABLET: 10; 325 TABLET ORAL at 14:21

## 2017-04-22 NOTE — ED PROVIDER NOTES
Subjective   HPI Comments: 36 y.o. male presents to ED with c/o groin pain. He reports that he was discharged from the hospital yesterday after having a heart cath done and that the cath site hurt when he left but has gotten worse since then. He states that the pain has also started radiating down to his testicles and legs. He claims that he has not urinated since leaving the hospital yesterday but also hasn't felt like he's needed to urinate. He denies back pain, a fever,or chills. No other acute complaints at this time.    Patient is a 36 y.o. male presenting with pain.   History provided by:  Patient  Pain   Location:  Groin  Severity:  Moderate  Onset quality:  Gradual  Duration:  1 day  Timing:  Constant  Progression:  Worsening  Chronicity:  New  Context:  Heart catherization  Associated symptoms: no abdominal pain, no congestion, no fever, no loss of consciousness, no nausea, no rhinorrhea, no shortness of breath and no vomiting        Review of Systems   Constitutional: Negative for chills and fever.   HENT: Negative for congestion and rhinorrhea.    Respiratory: Negative for shortness of breath.    Gastrointestinal: Negative for abdominal pain, nausea and vomiting.   Genitourinary: Positive for testicular pain.   Musculoskeletal: Negative for back pain.   Neurological: Negative for loss of consciousness.   All other systems reviewed and are negative.      Past Medical History:   Diagnosis Date   • Cholelithiasis    • Coronary artery disease    • Diverticulosis    • Elevated homocysteine    • Fractures    • Gall stones    • Heart attack 06/20/2016   • Heart attack    • Kidney stone    • Kidney stones    • Myocardial infarct 06/2016    stent x 2   • Obesity    • Shingles 2015       Allergies   Allergen Reactions   • Wasp Venom Swelling     Local swelling       Past Surgical History:   Procedure Laterality Date   • APPENDECTOMY  2007   • CARDIAC CATHETERIZATION     • CARDIAC CATHETERIZATION  06/2016    Sukhdev  Alpine stent in diagonal and LAD   • CARDIAC CATHETERIZATION N/A 4/19/2017    Procedure: Left Heart Cath;  Surgeon: Renny Wilson MD;  Location:  [facility];  Service:    • CARDIAC CATHETERIZATION N/A 4/20/2017    Procedure: Angioplasty-coronary;  Surgeon: Renny Wilson MD;  Location:  [facility];  Service:    • CHOLECYSTECTOMY  2005   • COLONOSCOPY  2014   • CORONARY STENT PLACEMENT  06/20/2016    2 stents   • FINGER FUSION Left     thumb   • HERNIA REPAIR  2013   • INTERVENTIONAL RADIOLOGY PROCEDURE N/A 4/20/2017    Procedure: Intravascular Ultrasound;  Surgeon: Renny Wilson MD;  Location:  [facility];  Service:    • KIDNEY STONE SURGERY      STONES REMOVED 2006, 2007, 2008       Family History   Problem Relation Age of Onset   • Arthritis Mother    • Heart attack Mother      30s   • Heart attack Father      30s   • Hypertension Father    • Heart attack Brother      30s   • Cancer Maternal Aunt    • Cancer Maternal Uncle    • Cancer Paternal Uncle    • Diabetes Mother    • Diabetes Father        Social History     Social History   • Marital status:      Spouse name: N/A   • Number of children: N/A   • Years of education: N/A     Social History Main Topics   • Smoking status: Current Every Day Smoker     Packs/day: 1.00     Years: 17.00     Types: Cigarettes     Last attempt to quit: 7/15/2016   • Smokeless tobacco: None   • Alcohol use Yes      Comment: OCCASIONAL   • Drug use: No   • Sexual activity: Not Asked     Other Topics Concern   • None     Social History Narrative    ** Merged History Encounter **              Objective   Physical Exam   Constitutional: He is oriented to person, place, and time. He appears well-developed and well-nourished. No distress.   HENT:   Mouth/Throat: Oropharynx is clear and moist.   Eyes: Conjunctivae are normal. No scleral icterus.   Neck: Normal range of motion. Neck supple. No  JVD present.   Cardiovascular: Normal rate, regular rhythm and normal heart sounds.    No murmur heard.  Easily palpable femoral pulse.   Pulmonary/Chest: Effort normal and breath sounds normal. No respiratory distress. He has no rales.   Abdominal: Soft. Bowel sounds are normal. There is no tenderness.   No palpable hematoma.    Musculoskeletal: Normal range of motion. He exhibits no edema or tenderness.    TTP in right inguinal area. Easily palpable femoral pulse, leg is pink and warm. No palpable hematoma or warmth. His tenderness is right over the bruised area. Dont palpate any hernia.   Neurological: He is alert and oriented to person, place, and time. No cranial nerve deficit.   Skin: Skin is warm. Bruising (Small amount of bruising in right inguinal area) noted. No rash noted.   There are 2 small bruises at the cath site, no redness or swelling   Psychiatric: He has a normal mood and affect. His behavior is normal.   Nursing note and vitals reviewed.      Procedures         ED Course  ED Course   Comment By Time   He tells me his wife will drive him home.  I will give pain medication.  He reports that he has not urinated since leaving the hospital.  I have asked the nurse to perform a bladder scan and catheterize him if need be. Panfilo Ramires MD 04/22 1256   I reviewed his CAT scan and don't see any problems.  Specifically I don't see any bleeding, hematoma, or retroperitoneal bleed.  I spoke with Dr. Adams the radiologist who is reading it and he advises me that he feels that it is negative as well .  Does not show a distended bladder.  His exam does not show redness or other evidence of infection.  He has been able to spontaneously void. Panfilo Ramires MD 04/22 6189   I spoke with Mr. Aguilar and his wife.  He tells me he still is uncomfortable.  I will repeat his pain medication.  He has narcotic pain medication at home that he is prescribed a chronic basis.  I will repeat the dose and his wife will  drive him home. Panfilo Ramires MD 04/22 1312   I discussed with Dr. Baeza who is on-call for Dr. Wilson.  He agreed that no further workup is indicated at this point and felt the best thing is to continue to observe it and give it more time. Panfilo Ramires MD 04/22 1335                     MDM  Number of Diagnoses or Management Options  Right inguinal pain: new and requires workup  S/P left heart catheterization by percutaneous approach:      Amount and/or Complexity of Data Reviewed  Clinical lab tests: ordered and reviewed  Tests in the radiology section of CPT®: ordered and reviewed  Review and summarize past medical records: yes  Discuss the patient with other providers: yes  Independent visualization of images, tracings, or specimens: yes    Patient Progress  Patient progress: stable      Final diagnoses:   Right inguinal pain   S/P left heart catheterization by percutaneous approach       Documentation assistance provided by harley Lee.  Information recorded by the scribe was done at my direction and has been verified and validated by me.     Jaylin Lee  04/22/17 1059       Jaylin Lee  04/22/17 1503       Panfilo Ramires MD  04/22/17 1057

## 2017-04-22 NOTE — DISCHARGE INSTRUCTIONS
No driving on the pain medication.  Return if you have fever, redness around the catheter site, swelling, worsening pain, or any concerns.

## 2017-04-25 ENCOUNTER — DOCUMENTATION (OUTPATIENT)
Dept: CARDIAC REHAB | Facility: HOSPITAL | Age: 37
End: 2017-04-25

## 2017-04-25 NOTE — PROGRESS NOTES
Patient identified as qualifier for Phase II Cardiac Rehab. Staff left detailed message with Patient to return call. At that time staff will discuss benefits of exercise, program protocol, and provide education.

## 2017-05-09 ENCOUNTER — OFFICE VISIT (OUTPATIENT)
Dept: INTERNAL MEDICINE | Facility: CLINIC | Age: 37
End: 2017-05-09

## 2017-05-09 VITALS
WEIGHT: 306.8 LBS | OXYGEN SATURATION: 98 % | TEMPERATURE: 97.8 F | BODY MASS INDEX: 42.95 KG/M2 | DIASTOLIC BLOOD PRESSURE: 76 MMHG | HEART RATE: 81 BPM | SYSTOLIC BLOOD PRESSURE: 120 MMHG | HEIGHT: 71 IN

## 2017-05-09 DIAGNOSIS — F32.1 MODERATE SINGLE CURRENT EPISODE OF MAJOR DEPRESSIVE DISORDER (HCC): Primary | ICD-10-CM

## 2017-05-09 DIAGNOSIS — F51.01 PRIMARY INSOMNIA: ICD-10-CM

## 2017-05-09 DIAGNOSIS — F41.9 ANXIETY: ICD-10-CM

## 2017-05-09 PROCEDURE — 99213 OFFICE O/P EST LOW 20 MIN: CPT | Performed by: FAMILY MEDICINE

## 2017-05-09 PROCEDURE — 96127 BRIEF EMOTIONAL/BEHAV ASSMT: CPT | Performed by: FAMILY MEDICINE

## 2017-05-09 RX ORDER — FLUOXETINE HYDROCHLORIDE 20 MG/1
20 CAPSULE ORAL DAILY
Qty: 30 CAPSULE | Refills: 1 | Status: SHIPPED | OUTPATIENT
Start: 2017-05-09 | End: 2017-07-26

## 2017-05-09 RX ORDER — CLONAZEPAM 0.5 MG/1
0.5 TABLET ORAL 2 TIMES DAILY PRN
Qty: 10 TABLET | Refills: 0 | Status: SHIPPED | OUTPATIENT
Start: 2017-05-09 | End: 2017-07-26 | Stop reason: SDUPTHER

## 2017-05-11 ENCOUNTER — OFFICE VISIT (OUTPATIENT)
Dept: INTERNAL MEDICINE | Facility: CLINIC | Age: 37
End: 2017-05-11

## 2017-05-11 VITALS
HEIGHT: 71 IN | DIASTOLIC BLOOD PRESSURE: 78 MMHG | RESPIRATION RATE: 14 BRPM | OXYGEN SATURATION: 98 % | HEART RATE: 92 BPM | WEIGHT: 300 LBS | TEMPERATURE: 98.2 F | SYSTOLIC BLOOD PRESSURE: 134 MMHG | BODY MASS INDEX: 42 KG/M2

## 2017-05-11 DIAGNOSIS — F32.1 MODERATE SINGLE CURRENT EPISODE OF MAJOR DEPRESSIVE DISORDER (HCC): ICD-10-CM

## 2017-05-11 DIAGNOSIS — F51.01 PRIMARY INSOMNIA: Primary | ICD-10-CM

## 2017-05-11 DIAGNOSIS — F41.9 ANXIETY: ICD-10-CM

## 2017-05-11 PROCEDURE — 99213 OFFICE O/P EST LOW 20 MIN: CPT | Performed by: FAMILY MEDICINE

## 2017-05-11 RX ORDER — ZOLPIDEM TARTRATE 5 MG/1
5 TABLET ORAL NIGHTLY PRN
Qty: 30 TABLET | Refills: 0 | Status: SHIPPED | OUTPATIENT
Start: 2017-05-11 | End: 2017-10-19

## 2017-05-15 ENCOUNTER — HOSPITAL ENCOUNTER (OUTPATIENT)
Facility: HOSPITAL | Age: 37
Discharge: HOME | End: 2017-05-15
Attending: INTERNAL MEDICINE | Admitting: INTERNAL MEDICINE

## 2017-05-15 ENCOUNTER — APPOINTMENT (OUTPATIENT)
Dept: CT IMAGING | Facility: HOSPITAL | Age: 37
End: 2017-05-15

## 2017-05-15 VITALS
RESPIRATION RATE: 16 BRPM | HEIGHT: 71 IN | WEIGHT: 305.56 LBS | HEART RATE: 71 BPM | OXYGEN SATURATION: 98 % | DIASTOLIC BLOOD PRESSURE: 76 MMHG | SYSTOLIC BLOOD PRESSURE: 117 MMHG | TEMPERATURE: 97.5 F | BODY MASS INDEX: 42.78 KG/M2

## 2017-05-15 DIAGNOSIS — Z72.0 TOBACCO ABUSE: ICD-10-CM

## 2017-05-15 DIAGNOSIS — E66.01 MORBID OBESITY, UNSPECIFIED OBESITY TYPE (HCC): ICD-10-CM

## 2017-05-15 DIAGNOSIS — E78.2 MIXED HYPERLIPIDEMIA: ICD-10-CM

## 2017-05-15 DIAGNOSIS — R12 HEARTBURN: ICD-10-CM

## 2017-05-15 DIAGNOSIS — I25.110 CORONARY ARTERY DISEASE INVOLVING NATIVE CORONARY ARTERY OF NATIVE HEART WITH UNSTABLE ANGINA PECTORIS (HCC): ICD-10-CM

## 2017-05-15 DIAGNOSIS — E78.5 HYPERLIPIDEMIA LDL GOAL <70: Primary | ICD-10-CM

## 2017-05-15 DIAGNOSIS — K21.9 GASTROESOPHAGEAL REFLUX DISEASE, ESOPHAGITIS PRESENCE NOT SPECIFIED: ICD-10-CM

## 2017-05-15 PROBLEM — I25.119 CORONARY ARTERY DISEASE INVOLVING NATIVE CORONARY ARTERY OF NATIVE HEART WITH ANGINA PECTORIS (HCC): Status: ACTIVE | Noted: 2017-04-19

## 2017-05-15 PROBLEM — I50.22 CHRONIC SYSTOLIC HEART FAILURE (HCC): Status: ACTIVE | Noted: 2017-05-15

## 2017-05-15 PROBLEM — R07.89 NON-CARDIAC CHEST PAIN: Status: ACTIVE | Noted: 2017-05-15

## 2017-05-15 LAB
APTT PPP: 45.4 SECONDS (ref 24–31)
BASOPHILS # BLD AUTO: 0.04 10*3/MM3 (ref 0–0.2)
BASOPHILS NFR BLD AUTO: 0.6 % (ref 0–1)
BNP SERPL-MCNC: 207 PG/ML (ref 0–100)
DEPRECATED RDW RBC AUTO: 44.4 FL (ref 37–54)
EOSINOPHIL # BLD AUTO: 0.19 10*3/MM3 (ref 0.1–0.3)
EOSINOPHIL NFR BLD AUTO: 2.8 % (ref 0–3)
ERYTHROCYTE [DISTWIDTH] IN BLOOD BY AUTOMATED COUNT: 13.4 % (ref 11.3–14.5)
HCT VFR BLD AUTO: 44.8 % (ref 38.9–50.9)
HGB BLD-MCNC: 15.1 G/DL (ref 13.1–17.5)
IMM GRANULOCYTES # BLD: 0.01 10*3/MM3 (ref 0–0.03)
IMM GRANULOCYTES NFR BLD: 0.1 % (ref 0–0.6)
INR PPP: 1.06
LYMPHOCYTES # BLD AUTO: 1.62 10*3/MM3 (ref 0.6–4.8)
LYMPHOCYTES NFR BLD AUTO: 24.1 % (ref 24–44)
MCH RBC QN AUTO: 30.7 PG (ref 27–31)
MCHC RBC AUTO-ENTMCNC: 33.7 G/DL (ref 32–36)
MCV RBC AUTO: 91.1 FL (ref 80–99)
MONOCYTES # BLD AUTO: 0.47 10*3/MM3 (ref 0–1)
MONOCYTES NFR BLD AUTO: 7 % (ref 0–12)
NEUTROPHILS # BLD AUTO: 4.38 10*3/MM3 (ref 1.5–8.3)
NEUTROPHILS NFR BLD AUTO: 65.4 % (ref 41–71)
PLATELET # BLD AUTO: 206 10*3/MM3 (ref 150–450)
PMV BLD AUTO: 10.8 FL (ref 6–12)
PROTHROMBIN TIME: 11.6 SECONDS (ref 9.6–11.5)
RBC # BLD AUTO: 4.92 10*6/MM3 (ref 4.2–5.76)
TROPONIN I SERPL-MCNC: <0.006 NG/ML
WBC NRBC COR # BLD: 6.71 10*3/MM3 (ref 3.5–10.8)

## 2017-05-15 PROCEDURE — 93005 ELECTROCARDIOGRAM TRACING: CPT | Performed by: NURSE PRACTITIONER

## 2017-05-15 PROCEDURE — 0 IOPAMIDOL PER 1 ML: Performed by: INTERNAL MEDICINE

## 2017-05-15 PROCEDURE — 85730 THROMBOPLASTIN TIME PARTIAL: CPT

## 2017-05-15 PROCEDURE — G0378 HOSPITAL OBSERVATION PER HR: HCPCS

## 2017-05-15 PROCEDURE — 85025 COMPLETE CBC W/AUTO DIFF WBC: CPT

## 2017-05-15 PROCEDURE — 93458 L HRT ARTERY/VENTRICLE ANGIO: CPT | Performed by: INTERNAL MEDICINE

## 2017-05-15 PROCEDURE — C1894 INTRO/SHEATH, NON-LASER: HCPCS | Performed by: INTERNAL MEDICINE

## 2017-05-15 PROCEDURE — 36415 COLL VENOUS BLD VENIPUNCTURE: CPT

## 2017-05-15 PROCEDURE — 84484 ASSAY OF TROPONIN QUANT: CPT | Performed by: NURSE PRACTITIONER

## 2017-05-15 PROCEDURE — 25010000002 FENTANYL CITRATE (PF) 100 MCG/2ML SOLUTION: Performed by: INTERNAL MEDICINE

## 2017-05-15 PROCEDURE — S0260 H&P FOR SURGERY: HCPCS | Performed by: INTERNAL MEDICINE

## 2017-05-15 PROCEDURE — 25010000002 HEPARIN (PORCINE) PER 1000 UNITS: Performed by: INTERNAL MEDICINE

## 2017-05-15 PROCEDURE — 25010000002 MIDAZOLAM PER 1 MG: Performed by: INTERNAL MEDICINE

## 2017-05-15 PROCEDURE — 25010000002 HEPARIN (PORCINE) PER 1000 UNITS

## 2017-05-15 PROCEDURE — 85610 PROTHROMBIN TIME: CPT

## 2017-05-15 PROCEDURE — C1769 GUIDE WIRE: HCPCS | Performed by: INTERNAL MEDICINE

## 2017-05-15 PROCEDURE — 83880 ASSAY OF NATRIURETIC PEPTIDE: CPT | Performed by: NURSE PRACTITIONER

## 2017-05-15 RX ORDER — MIDAZOLAM HYDROCHLORIDE 1 MG/ML
INJECTION INTRAMUSCULAR; INTRAVENOUS AS NEEDED
Status: DISCONTINUED | OUTPATIENT
Start: 2017-05-15 | End: 2017-05-15 | Stop reason: HOSPADM

## 2017-05-15 RX ORDER — SODIUM CHLORIDE 0.9 % (FLUSH) 0.9 %
1-10 SYRINGE (ML) INJECTION AS NEEDED
Status: DISCONTINUED | OUTPATIENT
Start: 2017-05-15 | End: 2017-05-15 | Stop reason: HOSPADM

## 2017-05-15 RX ORDER — FLUOXETINE HYDROCHLORIDE 20 MG/1
20 CAPSULE ORAL DAILY
Status: DISCONTINUED | OUTPATIENT
Start: 2017-05-15 | End: 2017-05-15 | Stop reason: HOSPADM

## 2017-05-15 RX ORDER — LIDOCAINE HYDROCHLORIDE 10 MG/ML
INJECTION, SOLUTION INFILTRATION; PERINEURAL AS NEEDED
Status: DISCONTINUED | OUTPATIENT
Start: 2017-05-15 | End: 2017-05-15 | Stop reason: HOSPADM

## 2017-05-15 RX ORDER — ATORVASTATIN CALCIUM 40 MG/1
40 TABLET, FILM COATED ORAL NIGHTLY
Status: DISCONTINUED | OUTPATIENT
Start: 2017-05-15 | End: 2017-05-15 | Stop reason: HOSPADM

## 2017-05-15 RX ORDER — FENTANYL CITRATE 50 UG/ML
INJECTION, SOLUTION INTRAMUSCULAR; INTRAVENOUS AS NEEDED
Status: DISCONTINUED | OUTPATIENT
Start: 2017-05-15 | End: 2017-05-15 | Stop reason: HOSPADM

## 2017-05-15 RX ORDER — HYDROCODONE BITARTRATE AND ACETAMINOPHEN 7.5; 325 MG/1; MG/1
1 TABLET ORAL EVERY 8 HOURS PRN
Status: DISCONTINUED | OUTPATIENT
Start: 2017-05-15 | End: 2017-05-15 | Stop reason: HOSPADM

## 2017-05-15 RX ORDER — NITROGLYCERIN 20 MG/100ML
5-200 INJECTION INTRAVENOUS
Status: DISCONTINUED | OUTPATIENT
Start: 2017-05-15 | End: 2017-05-15

## 2017-05-15 RX ORDER — PRASUGREL 10 MG/1
10 TABLET, FILM COATED ORAL DAILY
Status: DISCONTINUED | OUTPATIENT
Start: 2017-05-15 | End: 2017-05-15 | Stop reason: HOSPADM

## 2017-05-15 RX ORDER — ASPIRIN 81 MG/1
81 TABLET ORAL DAILY
Status: DISCONTINUED | OUTPATIENT
Start: 2017-05-15 | End: 2017-05-15 | Stop reason: HOSPADM

## 2017-05-15 RX ORDER — HEPARIN SODIUM 1000 [USP'U]/ML
INJECTION, SOLUTION INTRAVENOUS; SUBCUTANEOUS AS NEEDED
Status: DISCONTINUED | OUTPATIENT
Start: 2017-05-15 | End: 2017-05-15 | Stop reason: HOSPADM

## 2017-05-15 RX ORDER — PANTOPRAZOLE SODIUM 40 MG/1
40 TABLET, DELAYED RELEASE ORAL DAILY
Qty: 90 TABLET | Refills: 1 | Status: SHIPPED | OUTPATIENT
Start: 2017-05-15 | End: 2017-10-19 | Stop reason: SDUPTHER

## 2017-05-15 RX ORDER — SODIUM CHLORIDE 9 MG/ML
1.5 INJECTION, SOLUTION INTRAVENOUS CONTINUOUS
Status: ACTIVE | OUTPATIENT
Start: 2017-05-15 | End: 2017-05-15

## 2017-05-15 RX ORDER — METOPROLOL SUCCINATE 25 MG/1
25 TABLET, EXTENDED RELEASE ORAL
Status: DISCONTINUED | OUTPATIENT
Start: 2017-05-15 | End: 2017-05-15 | Stop reason: SDUPTHER

## 2017-05-15 RX ORDER — GABAPENTIN 300 MG/1
300 CAPSULE ORAL EVERY 8 HOURS SCHEDULED
Status: DISCONTINUED | OUTPATIENT
Start: 2017-05-15 | End: 2017-05-15 | Stop reason: HOSPADM

## 2017-05-15 RX ORDER — CLONAZEPAM 0.5 MG/1
0.5 TABLET ORAL 2 TIMES DAILY PRN
Status: DISCONTINUED | OUTPATIENT
Start: 2017-05-15 | End: 2017-05-15 | Stop reason: HOSPADM

## 2017-05-15 RX ORDER — LISINOPRIL 2.5 MG/1
2.5 TABLET ORAL DAILY
Status: DISCONTINUED | OUTPATIENT
Start: 2017-05-15 | End: 2017-05-15 | Stop reason: HOSPADM

## 2017-05-15 RX ORDER — NITROGLYCERIN 0.4 MG/1
0.4 TABLET SUBLINGUAL
Status: DISCONTINUED | OUTPATIENT
Start: 2017-05-15 | End: 2017-05-15 | Stop reason: HOSPADM

## 2017-05-15 RX ORDER — ZOLPIDEM TARTRATE 5 MG/1
5 TABLET ORAL NIGHTLY PRN
Status: DISCONTINUED | OUTPATIENT
Start: 2017-05-15 | End: 2017-05-15 | Stop reason: HOSPADM

## 2017-05-15 RX ORDER — CLOPIDOGREL BISULFATE 75 MG/1
75 TABLET ORAL DAILY
Status: DISCONTINUED | OUTPATIENT
Start: 2017-05-15 | End: 2017-05-15

## 2017-05-15 RX ORDER — METOPROLOL SUCCINATE 25 MG/1
25 TABLET, EXTENDED RELEASE ORAL DAILY
Status: DISCONTINUED | OUTPATIENT
Start: 2017-05-15 | End: 2017-05-15 | Stop reason: HOSPADM

## 2017-05-15 RX ADMIN — ASPIRIN 81 MG: 81 TABLET, COATED ORAL at 12:10

## 2017-05-15 RX ADMIN — HEPARIN SODIUM 7.2 UNITS/KG/HR: 10000 INJECTION, SOLUTION INTRAVENOUS at 09:54

## 2017-05-15 RX ADMIN — CLOPIDOGREL BISULFATE 75 MG: 75 TABLET ORAL at 12:10

## 2017-05-15 RX ADMIN — NITROGLYCERIN 10 MCG/MIN: 20 INJECTION INTRAVENOUS at 09:55

## 2017-05-16 ENCOUNTER — TELEPHONE (OUTPATIENT)
Dept: INTERNAL MEDICINE | Facility: CLINIC | Age: 37
End: 2017-05-16

## 2017-05-16 ENCOUNTER — DOCUMENTATION (OUTPATIENT)
Dept: CARDIAC REHAB | Facility: HOSPITAL | Age: 37
End: 2017-05-16

## 2017-05-16 RX ORDER — LORAZEPAM 1 MG/1
1 TABLET ORAL EVERY 8 HOURS PRN
Qty: 30 TABLET | Refills: 0 | OUTPATIENT
Start: 2017-05-16 | End: 2017-06-05 | Stop reason: SDUPTHER

## 2017-05-24 ENCOUNTER — TELEPHONE (OUTPATIENT)
Dept: INTERNAL MEDICINE | Facility: CLINIC | Age: 37
End: 2017-05-24

## 2017-06-05 RX ORDER — LORAZEPAM 1 MG/1
1 TABLET ORAL EVERY 8 HOURS PRN
Qty: 30 TABLET | Refills: 0 | OUTPATIENT
Start: 2017-06-05 | End: 2017-10-19

## 2017-06-07 DIAGNOSIS — M54.12 CERVICAL RADICULAR PAIN: ICD-10-CM

## 2017-06-07 DIAGNOSIS — M54.16 LUMBAR RADICULAR PAIN: ICD-10-CM

## 2017-06-07 RX ORDER — GABAPENTIN 300 MG/1
300 CAPSULE ORAL 3 TIMES DAILY
Qty: 270 CAPSULE | Refills: 1 | Status: SHIPPED | OUTPATIENT
Start: 2017-06-07 | End: 2017-07-26 | Stop reason: SDUPTHER

## 2017-07-26 ENCOUNTER — OFFICE VISIT (OUTPATIENT)
Dept: INTERNAL MEDICINE | Facility: CLINIC | Age: 37
End: 2017-07-26

## 2017-07-26 VITALS
SYSTOLIC BLOOD PRESSURE: 130 MMHG | HEART RATE: 92 BPM | OXYGEN SATURATION: 97 % | BODY MASS INDEX: 42 KG/M2 | WEIGHT: 300 LBS | DIASTOLIC BLOOD PRESSURE: 80 MMHG | TEMPERATURE: 98.9 F | HEIGHT: 71 IN

## 2017-07-26 DIAGNOSIS — R91.8 ABNORMAL CT SCAN, LUNG: ICD-10-CM

## 2017-07-26 DIAGNOSIS — R59.0 HILAR LYMPHADENOPATHY: ICD-10-CM

## 2017-07-26 DIAGNOSIS — M54.16 LUMBAR RADICULAR PAIN: ICD-10-CM

## 2017-07-26 DIAGNOSIS — M54.12 CERVICAL RADICULAR PAIN: ICD-10-CM

## 2017-07-26 DIAGNOSIS — R55 SYNCOPE, UNSPECIFIED SYNCOPE TYPE: Primary | ICD-10-CM

## 2017-07-26 DIAGNOSIS — R11.0 NAUSEA: ICD-10-CM

## 2017-07-26 DIAGNOSIS — F32.1 MODERATE SINGLE CURRENT EPISODE OF MAJOR DEPRESSIVE DISORDER (HCC): ICD-10-CM

## 2017-07-26 DIAGNOSIS — F41.9 ANXIETY: ICD-10-CM

## 2017-07-26 PROCEDURE — 99214 OFFICE O/P EST MOD 30 MIN: CPT | Performed by: FAMILY MEDICINE

## 2017-07-26 RX ORDER — GABAPENTIN 300 MG/1
300 CAPSULE ORAL 3 TIMES DAILY
Qty: 270 CAPSULE | Refills: 0 | Status: SHIPPED | OUTPATIENT
Start: 2017-07-26 | End: 2018-08-08 | Stop reason: SDUPTHER

## 2017-07-26 RX ORDER — CLONAZEPAM 0.5 MG/1
0.5 TABLET ORAL 2 TIMES DAILY PRN
Qty: 20 TABLET | Refills: 1 | Status: SHIPPED | OUTPATIENT
Start: 2017-07-26 | End: 2017-10-19 | Stop reason: SDUPTHER

## 2017-07-26 RX ORDER — PROMETHAZINE HYDROCHLORIDE 25 MG/1
25 TABLET ORAL EVERY 6 HOURS PRN
Qty: 60 TABLET | Refills: 2 | Status: SHIPPED | OUTPATIENT
Start: 2017-07-26 | End: 2017-10-19 | Stop reason: SDUPTHER

## 2017-07-26 RX ORDER — FLUOXETINE HYDROCHLORIDE 40 MG/1
40 CAPSULE ORAL DAILY
Qty: 30 CAPSULE | Refills: 5 | Status: SHIPPED | OUTPATIENT
Start: 2017-07-26 | End: 2017-08-24 | Stop reason: SDUPTHER

## 2017-07-26 NOTE — PROGRESS NOTES
"Subjective    Kenneth Aguilar is a 36 y.o. 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 GOT UP MONDAY MORNING TO GET READY FOR WORK AND PASSED OUT. PATIENT STARTED VOMITING ON AND OFF MOST OF THE DAY MONDAY. PATIENT SAYS THAT WHEN HE EATS ANYTHING IT MAKES HIM SICK. HE HAS NOT ANY ANY MORE EPISODES OF SYNCOPE.     History of Present Illness   Passed out on Monday. Got up and took meds, took shower, ate bowl of cereal. He went in to get his clothes for work and he woke up in the floor. As he was heading to get clothes ears were ringing and things seemed they were getting further away. He does not remember heart palpitations. He had a sense of pre-syncope. About a month and a half ago he had a similar episode at work but did not pass out, had the ear ringing, things going further away. He sat down, drank some water, and when he thought he was okay. He stood up and symptoms came back. He went to lunch and relaxed for a period, did not feel bad for rest of day. No dizziness with turning head or room spinning. No neck tie. He was told all work up Monday looked good.     Since event he's not been great but has not felt like he was going to pass out. Most of day Monday everything he tried to take in he threw back up. Able to hold down water Monday night. Now if he eats he has nausea, no vomiting since Monday. Symptoms last 2-3 hours then subside. No abdominal pain, but nausea is \"all consuming\". He tries different positions and nothing helps with nausea. No black stools nor blood in stool. Monday he noticed he was drinking and it feels like it's getting stuck in chest. He points to middle chest, says it feels like things were getting stuck. He had an upper scope a couple of years ago and he says it was all okay. No heartburn. No sensation that this is heart related. He's still having some anxiety attacks. Saw cardiologist " three weeks ago, no changes.  Starts feeling badly shortly after eating.     Anxiety not controlled. Has been taking Prozac 20 mg daily but has been out for a week. Not sure if he should refill, wanted my opinion first, but he was still very anxious with this dose.    The following portions of the patient's history were reviewed and updated as appropriate: allergies, current medications, past family history, past medical history, past social history, past surgical history and problem list.    Review of Systems   Constitutional: Positive for appetite change. Negative for activity change and unexpected weight change.   Gastrointestinal: Positive for nausea. Negative for abdominal distention, anal bleeding, blood in stool, constipation and vomiting.   Musculoskeletal: Positive for back pain.   Neurological: Positive for dizziness, syncope, light-headedness and numbness.   Psychiatric/Behavioral: Positive for sleep disturbance. The patient is nervous/anxious.        Vitals:    07/26/17 1528   BP: 130/80   Pulse: 92   Temp: 98.9 °F (37.2 °C)   SpO2: 97%       Objective   Physical Exam   Constitutional: He is oriented to person, place, and time. Vital signs are normal. He appears well-developed and well-nourished. He is active. He does not appear ill.   Appears stated age. Well groomed. Extremely Obese. Mild body odor.     HENT:   Head: Normocephalic and atraumatic.   Right Ear: Hearing normal.   Left Ear: Hearing normal.   Nose: Nose normal.   Eyes: EOM and lids are normal. Pupils are equal, round, and reactive to light. No scleral icterus.   Cardiovascular: Normal rate, regular rhythm and normal heart sounds.    Pulmonary/Chest: Effort normal and breath sounds normal.   Abdominal: Soft. Bowel sounds are normal. There is tenderness (mild discomfort to palpation) in the epigastric area. There is no rigidity, no rebound and no guarding.   Exam limited by obesity.   Neurological: He is alert and oriented to person, place,  and time. No cranial nerve deficit. Gait normal.   Skin: Skin is warm. He is not diaphoretic. No cyanosis. Nails show no clubbing.   Psychiatric: His speech is normal and behavior is normal. Judgment and thought content normal. His mood appears anxious. Cognition and memory are normal.   Nursing note and vitals reviewed.      Assessment/Plan   Kenneth was seen today for syncope.    Diagnoses and all orders for this visit:    Syncope, unspecified syncope type  Comments:  Unclear etiology, likely not cardiac based on symptoms. Records requested.    Nausea  Comments:  Need to rule out abnormality distal esophagus.   Orders:  -     promethazine (PHENERGAN) 25 MG tablet; Take 1 tablet by mouth Every 6 (Six) Hours As Needed for Nausea or Vomiting.    Hilar lymphadenopathy  Comments:  Noted to have bilateral hilar lymphadenopathy on a CT chest 11/14/16 at [facility] in [place], was to have 3 month repeat, ordered.  Orders:  -     CT Chest With Contrast; Future    Abnormal CT scan, lung  -     CT Chest With Contrast; Future    Moderate single current episode of major depressive disorder  Comments:  Increased prozac from 20 to 40 mg to gain better control on anxiety.  Orders:  -     clonazePAM (KlonoPIN) 0.5 MG tablet; Take 1 tablet by mouth 2 (Two) Times a Day As Needed for Anxiety.    Anxiety  Comments:  Use benzo prn.  Orders:  -     FLUoxetine (PROZAC) 40 MG capsule; Take 1 capsule by mouth Daily.    Lumbar radicular pain  Comments:  Gabapentin refilled, educated patient on controlled nature of medicine.  Orders:  -     gabapentin (NEURONTIN) 300 MG capsule; Take 1 capsule by mouth 3 (Three) Times a Day.    Cervical radicular pain  Comments:  BELKYS requested.  Orders:  -     gabapentin (NEURONTIN) 300 MG capsule; Take 1 capsule by mouth 3 (Three) Times a Day.               Shawna Holly MD

## 2017-08-03 ENCOUNTER — OFFICE VISIT (OUTPATIENT)
Dept: INTERNAL MEDICINE | Facility: CLINIC | Age: 37
End: 2017-08-03

## 2017-08-03 VITALS
TEMPERATURE: 98 F | HEART RATE: 79 BPM | DIASTOLIC BLOOD PRESSURE: 68 MMHG | SYSTOLIC BLOOD PRESSURE: 126 MMHG | HEIGHT: 71 IN | WEIGHT: 298 LBS | OXYGEN SATURATION: 98 % | BODY MASS INDEX: 41.72 KG/M2

## 2017-08-03 DIAGNOSIS — M43.10 ANTEROLISTHESIS: Primary | ICD-10-CM

## 2017-08-03 DIAGNOSIS — R11.0 NAUSEA: ICD-10-CM

## 2017-08-03 DIAGNOSIS — M54.16 LUMBAR RADICULAR PAIN: ICD-10-CM

## 2017-08-03 DIAGNOSIS — G89.4 CHRONIC PAIN SYNDROME: ICD-10-CM

## 2017-08-03 DIAGNOSIS — R13.19 OTHER DYSPHAGIA: ICD-10-CM

## 2017-08-03 PROCEDURE — 99213 OFFICE O/P EST LOW 20 MIN: CPT | Performed by: FAMILY MEDICINE

## 2017-08-03 RX ORDER — CIPROFLOXACIN 500 MG/1
TABLET, FILM COATED ORAL
COMMUNITY
Start: 2017-08-02 | End: 2017-10-19

## 2017-08-03 RX ORDER — HYDROCODONE BITARTRATE AND ACETAMINOPHEN 5; 325 MG/1; MG/1
1 TABLET ORAL EVERY 6 HOURS PRN
Qty: 12 TABLET | Refills: 0 | Status: SHIPPED | OUTPATIENT
Start: 2017-08-03 | End: 2017-08-03 | Stop reason: SDUPTHER

## 2017-08-03 RX ORDER — HYDROCODONE BITARTRATE AND ACETAMINOPHEN 5; 325 MG/1; MG/1
1 TABLET ORAL EVERY 6 HOURS PRN
Qty: 12 TABLET | Refills: 0 | Status: SHIPPED | OUTPATIENT
Start: 2017-08-03 | End: 2017-10-19

## 2017-08-03 NOTE — PROGRESS NOTES
Subjective    Kenneth Aguilar is a 36 y.o. male here for:  Chief Complaint   Patient presents with   • Pyelonephritis     [facility] F/U [facility] D/C 08/01; KIDNEY INFECTION AND PAIN MANAGEMENT   • Med Refill     WOULD LIKE TO DISCUSS REFILL OF NORCO     History of Present Illness   Patient was called in for pill count while on vacation. He made arrangements to go in but then had to work unexpectedly when he was supposed to go in. Was discharged. He went through some withdrawal with abdominal pain, diarrhea. Symptoms seemed to ease. He figured the withdrawal was over. Saturday evening he started having intractable vomiting with pain in the LLQ. He was admitted. He was treated for a urinary tract infection but they felt the biggest part of his symptoms was from coming off the pain medicine. They set him up to see an orthopedic surgeon based on his back imaging. He was discharged with some pain medicine. He has been out for a day. Last night was not the best he admits. He feels like food is getting stuck when he eats it. Last scope was about 4-5 years ago.     The following portions of the patient's history were reviewed and updated as appropriate: allergies, current medications, past family history, past medical history, past social history, past surgical history and problem list.    Review of Systems   Constitutional: Positive for activity change and appetite change.   Gastrointestinal: Positive for abdominal pain, nausea and vomiting. Negative for blood in stool.   Musculoskeletal: Positive for arthralgias and back pain.       Vitals:    08/03/17 1017   BP: 126/68   Pulse: 79   Temp: 98 °F (36.7 °C)   SpO2: 98%       Objective   Physical Exam   Constitutional: He is oriented to person, place, and time. Vital signs are normal. He appears well-developed and well-nourished. He is active. He does not appear ill.   Appears stated age. Well groomed. Extremely Obese.   HENT:   Head: Normocephalic and atraumatic.    Right Ear: Hearing normal.   Left Ear: Hearing normal.   Nose: Nose normal.   Eyes: EOM and lids are normal. Pupils are equal, round, and reactive to light. No scleral icterus.   Cardiovascular: Normal rate, regular rhythm and normal heart sounds.    Pulmonary/Chest: Effort normal and breath sounds normal.   Abdominal: Soft. Bowel sounds are normal. There is no tenderness. There is no rigidity, no rebound and no guarding.   Exam limited by obesity.   Neurological: He is alert and oriented to person, place, and time. No cranial nerve deficit. Gait normal.   Skin: Skin is warm. He is not diaphoretic. No cyanosis. Nails show no clubbing.   Psychiatric: His speech is normal and behavior is normal. Judgment and thought content normal. His mood appears anxious. Cognition and memory are normal.   Nursing note and vitals reviewed.       Discharge summary from 8/2/17 reviewed. CT abdomen unremarkable. Scheduled for ortho visit 8/7 at 8:30 am.    Assessment/Plan   Kenneth was seen today for pyelonephritis and med refill.    Diagnoses and all orders for this visit:    Anterolisthesis  -     Discontinue: HYDROcodone-acetaminophen (NORCO) 5-325 MG per tablet; Take 1 tablet by mouth Every 6 (Six) Hours As Needed for Severe Pain  (7-10).  -     Ambulatory Referral to Pain Management  -     HYDROcodone-acetaminophen (NORCO) 5-325 MG per tablet; Take 1 tablet by mouth Every 6 (Six) Hours As Needed for Severe Pain  (7-10).    Lumbar radicular pain  -     Discontinue: HYDROcodone-acetaminophen (NORCO) 5-325 MG per tablet; Take 1 tablet by mouth Every 6 (Six) Hours As Needed for Severe Pain  (7-10).  -     Ambulatory Referral to Pain Management  -     HYDROcodone-acetaminophen (NORCO) 5-325 MG per tablet; Take 1 tablet by mouth Every 6 (Six) Hours As Needed for Severe Pain  (7-10).    Chronic pain syndrome  -     Discontinue: HYDROcodone-acetaminophen (NORCO) 5-325 MG per tablet; Take 1 tablet by mouth Every 6 (Six) Hours As Needed  for Severe Pain  (7-10).  -     Ambulatory Referral to Pain Management  -     HYDROcodone-acetaminophen (NORCO) 5-325 MG per tablet; Take 1 tablet by mouth Every 6 (Six) Hours As Needed for Severe Pain  (7-10).    Other dysphagia  -     Ambulatory referral for Screening EGD    Nausea  -     Ambulatory referral for Screening EGD      BELKYS requested, 3 day supply of medicine to help him with pain and to ease down on dose provided. Follow up with ortho as scheduled.          Shawna Holly MD

## 2017-08-15 ENCOUNTER — TELEPHONE (OUTPATIENT)
Dept: INTERNAL MEDICINE | Facility: CLINIC | Age: 37
End: 2017-08-15

## 2017-08-15 NOTE — TELEPHONE ENCOUNTER
GREGG, LOGAN/MERLINE CALLED WANTING TO KNOW IF THE PATIENT HAS HAD CT SCAN OF CHEST. I CHECKED THE CHART AND THE CT SCAN THAT WAS ORDERED ON 07/26, STATUS IS ORDERED, HAS NOT BEEN PERFORMED.  LEFT MESSAGE FOR HIS  THAT CT HAS NOT BEEN PERFORMED OF CHEST. ASK HER TO CALL ME BACK IF SHE HAS ANY MORE QUESTIONS.

## 2017-08-24 DIAGNOSIS — F41.9 ANXIETY: ICD-10-CM

## 2017-08-24 RX ORDER — FLUOXETINE HYDROCHLORIDE 40 MG/1
40 CAPSULE ORAL DAILY
Qty: 90 CAPSULE | Refills: 2 | Status: SHIPPED | OUTPATIENT
Start: 2017-08-24 | End: 2017-10-19 | Stop reason: SDUPTHER

## 2017-10-18 ENCOUNTER — TELEPHONE (OUTPATIENT)
Dept: CARDIOLOGY | Facility: CLINIC | Age: 37
End: 2017-10-18

## 2017-10-18 NOTE — TELEPHONE ENCOUNTER
Requesting cardiac clearance for a L5 and S1 injection. They are also requesting to hold effient. OK to hold 3 days but continue ASA 81 mg?

## 2017-10-19 ENCOUNTER — OFFICE VISIT (OUTPATIENT)
Dept: INTERNAL MEDICINE | Facility: CLINIC | Age: 37
End: 2017-10-19

## 2017-10-19 VITALS
TEMPERATURE: 98.2 F | SYSTOLIC BLOOD PRESSURE: 130 MMHG | WEIGHT: 278 LBS | OXYGEN SATURATION: 98 % | HEIGHT: 71 IN | HEART RATE: 90 BPM | DIASTOLIC BLOOD PRESSURE: 80 MMHG | BODY MASS INDEX: 38.92 KG/M2

## 2017-10-19 DIAGNOSIS — I25.110 CORONARY ARTERY DISEASE INVOLVING NATIVE CORONARY ARTERY OF NATIVE HEART WITH UNSTABLE ANGINA PECTORIS (HCC): ICD-10-CM

## 2017-10-19 DIAGNOSIS — I50.22 CHRONIC SYSTOLIC HEART FAILURE (HCC): ICD-10-CM

## 2017-10-19 DIAGNOSIS — K21.9 GASTROESOPHAGEAL REFLUX DISEASE, ESOPHAGITIS PRESENCE NOT SPECIFIED: ICD-10-CM

## 2017-10-19 DIAGNOSIS — F41.9 ANXIETY: ICD-10-CM

## 2017-10-19 DIAGNOSIS — F32.1 MODERATE SINGLE CURRENT EPISODE OF MAJOR DEPRESSIVE DISORDER (HCC): ICD-10-CM

## 2017-10-19 DIAGNOSIS — M54.12 CERVICAL RADICULAR PAIN: ICD-10-CM

## 2017-10-19 DIAGNOSIS — E78.5 HYPERLIPIDEMIA LDL GOAL <70: ICD-10-CM

## 2017-10-19 DIAGNOSIS — M54.16 LUMBAR RADICULAR PAIN: ICD-10-CM

## 2017-10-19 DIAGNOSIS — I10 ESSENTIAL HYPERTENSION: ICD-10-CM

## 2017-10-19 DIAGNOSIS — R12 HEARTBURN: ICD-10-CM

## 2017-10-19 DIAGNOSIS — I25.119 CORONARY ARTERY DISEASE INVOLVING NATIVE CORONARY ARTERY OF NATIVE HEART WITH ANGINA PECTORIS (HCC): Primary | ICD-10-CM

## 2017-10-19 DIAGNOSIS — R11.0 NAUSEA: ICD-10-CM

## 2017-10-19 PROCEDURE — 99214 OFFICE O/P EST MOD 30 MIN: CPT | Performed by: PHYSICIAN ASSISTANT

## 2017-10-19 RX ORDER — FLUOXETINE HYDROCHLORIDE 20 MG/1
20 CAPSULE ORAL DAILY
Qty: 30 CAPSULE | Refills: 5 | Status: SHIPPED | OUTPATIENT
Start: 2017-10-19 | End: 2017-11-10 | Stop reason: SDUPTHER

## 2017-10-19 RX ORDER — CLONAZEPAM 0.5 MG/1
0.5 TABLET ORAL 2 TIMES DAILY PRN
Qty: 20 TABLET | Refills: 1 | Status: SHIPPED | OUTPATIENT
Start: 2017-10-19 | End: 2017-11-21 | Stop reason: SDUPTHER

## 2017-10-19 RX ORDER — PANTOPRAZOLE SODIUM 40 MG/1
40 TABLET, DELAYED RELEASE ORAL DAILY
Qty: 30 TABLET | Refills: 5 | Status: SHIPPED | OUTPATIENT
Start: 2017-10-19 | End: 2018-08-08 | Stop reason: SDUPTHER

## 2017-10-19 RX ORDER — OXYCODONE HYDROCHLORIDE AND ACETAMINOPHEN 5; 325 MG/1; MG/1
TABLET ORAL
COMMUNITY
Start: 2017-10-13 | End: 2017-11-21

## 2017-10-19 RX ORDER — FLUOXETINE HYDROCHLORIDE 40 MG/1
40 CAPSULE ORAL DAILY
Qty: 30 CAPSULE | Refills: 5 | Status: SHIPPED | OUTPATIENT
Start: 2017-10-19 | End: 2017-11-21

## 2017-10-19 RX ORDER — FUROSEMIDE 20 MG/1
TABLET ORAL
Qty: 60 TABLET | Refills: 5 | Status: SHIPPED | OUTPATIENT
Start: 2017-10-19 | End: 2018-08-08 | Stop reason: SDUPTHER

## 2017-10-19 RX ORDER — PRASUGREL 10 MG/1
10 TABLET, FILM COATED ORAL DAILY
Qty: 90 TABLET | Refills: 1 | Status: SHIPPED | OUTPATIENT
Start: 2017-10-19 | End: 2018-10-14

## 2017-10-19 RX ORDER — METOPROLOL SUCCINATE 25 MG/1
25 TABLET, EXTENDED RELEASE ORAL
Qty: 30 TABLET | Refills: 5 | Status: SHIPPED | OUTPATIENT
Start: 2017-10-19 | End: 2018-08-08 | Stop reason: SDUPTHER

## 2017-10-19 RX ORDER — PROMETHAZINE HYDROCHLORIDE 25 MG/1
25 TABLET ORAL EVERY 6 HOURS PRN
Qty: 60 TABLET | Refills: 2 | Status: SHIPPED | OUTPATIENT
Start: 2017-10-19 | End: 2017-11-21 | Stop reason: SDUPTHER

## 2017-10-19 RX ORDER — LISINOPRIL 2.5 MG/1
2.5 TABLET ORAL DAILY
Qty: 30 TABLET | Refills: 5 | Status: SHIPPED | OUTPATIENT
Start: 2017-10-19 | End: 2018-08-08 | Stop reason: SDUPTHER

## 2017-10-19 RX ORDER — ATORVASTATIN CALCIUM 40 MG/1
40 TABLET, FILM COATED ORAL NIGHTLY
Qty: 90 TABLET | Refills: 1 | Status: SHIPPED | OUTPATIENT
Start: 2017-10-19 | End: 2018-08-08 | Stop reason: SDUPTHER

## 2017-10-19 RX ORDER — POTASSIUM CHLORIDE 20 MEQ/1
20 TABLET, EXTENDED RELEASE ORAL DAILY
Qty: 30 TABLET | Refills: 5 | Status: SHIPPED | OUTPATIENT
Start: 2017-10-19 | End: 2018-08-08 | Stop reason: SDUPTHER

## 2017-10-19 RX ORDER — OXYCODONE HYDROCHLORIDE AND ACETAMINOPHEN 5; 325 MG/1; MG/1
1 TABLET ORAL 3 TIMES DAILY PRN
COMMUNITY
End: 2017-11-21

## 2017-10-19 RX ORDER — CLONAZEPAM 0.5 MG/1
0.5 TABLET ORAL 2 TIMES DAILY PRN
Qty: 20 TABLET | Refills: 1 | Status: CANCELLED | OUTPATIENT
Start: 2017-10-19

## 2017-10-19 NOTE — PROGRESS NOTES
Kenneth Aguilar is a 36 y.o. male.     Subjective   History of Present Illness   Here today for follow up and med refills. He denies CP, SOA, edema or palpitations and fels his HTN is well controlled with current medications.     Also concerned with occasional vomiting which has been worse this week.  He reports vomiting several times daily earlier this week.  Feels a little nauseous this morning.  He has been very stressed at home and anxiety has been worse than normal.  Rescheduled EGD 3 times because of struggles with coordinating with work schedule. He anticipates he should be able to schedule again in a few weeks.          The following portions of the patient's history were reviewed and updated as appropriate: allergies, current medications, past family history, past medical history, past social history, past surgical history and problem list.    Review of Systems    Constitutional: Negative for appetite change, chills, fatigue, fever and unexpected weight change.   HENT: Negative for congestion, ear pain, hearing loss, nosebleeds, postnasal drip, rhinorrhea, sore throat, tinnitus and trouble swallowing.    Eyes: Negative for photophobia, discharge and visual disturbance.   Respiratory: Negative for cough, chest tightness, shortness of breath and wheezing.    Cardiovascular: Negative for chest pain, palpitations and leg swelling.   Gastrointestinal: vomiting, nausea. Negative for abdominal distention, abdominal pain, blood in stool, constipation, diarrhea, nausea  Endocrine: Negative for cold intolerance, heat intolerance, polydipsia, polyphagia and polyuria.   Musculoskeletal: Negative for arthralgias, back pain, joint swelling, myalgias, neck pain and neck stiffness.   Skin: Negative for color change, pallor, rash and wound.   Allergic/Immunologic: Negative for environmental allergies, food allergies and immunocompromised state.   Neurological: Negative for dizziness, tremors, seizures, weakness, numbness  "and headaches.   Hematological: Negative for adenopathy. Does not bruise/bleed easily.   Psychiatric/Behavioral: anxious. Negative for sleep disturbances, agitation, behavioral problems, confusion, hallucinations, self-injury and suicidal ideas.         Objective    Physical Exam  Constitutional: Oriented to person, place, and time. Appears well-developed and well-nourished.   HENT:   Head: Normocephalic and atraumatic.   Eyes: EOM are normal. Pupils are equal, round, and reactive to light.   Neck: Normal range of motion. Neck supple.   Cardiovascular: Normal rate, regular rhythm and normal heart sounds.    Pulmonary/Chest: Effort normal and breath sounds normal. No respiratory distress.  Has no wheezes or rales. Exhibits no chest wall tenderness.   Abdominal: epigastric tenderness. Soft. Bowel sounds are normal. Exhibits no distension and no mass.   Musculoskeletal: Normal range of motion. Exhibits no tenderness.   Neurological: Alert and oriented to person, place, and time.   Skin: Skin is warm and dry.   Psychiatric: Has a normal mood and affect. Behavior is normal. Judgment and thought content normal.       /80  Pulse 90  Temp 98.2 °F (36.8 °C)  Ht 71\" (180.3 cm)  Wt 278 lb (126 kg)  SpO2 98%  BMI 38.77 kg/m2    Nursing note and vitals reviewed.        Assessment/Plan   Kenneth was seen today for follow-up and vomiting.    Diagnoses and all orders for this visit:    Coronary artery disease involving native coronary artery of native heart with angina pectoris    Nausea  Comments:  Need to rule out abnormality distal esophagus.   Orders:  -     promethazine (PHENERGAN) 25 MG tablet; Take 1 tablet by mouth Every 6 (Six) Hours As Needed for Nausea or Vomiting.    Coronary artery disease involving native coronary artery of native heart with unstable angina pectoris  -     prasugrel (EFFIENT) 10 MG tablet; Take 1 tablet by mouth Daily for 360 days.  -     metoprolol succinate XL (TOPROL-XL) 25 MG 24 hr " tablet; Take 1 tablet by mouth Daily for 360 days.    Heartburn  -     pantoprazole (PROTONIX) 40 MG EC tablet; Take 1 tablet by mouth Daily.    Gastroesophageal reflux disease, esophagitis presence not specified  -     pantoprazole (PROTONIX) 40 MG EC tablet; Take 1 tablet by mouth Daily.    Anxiety  Comments:  Use benzo prn.  Orders:  -     FLUoxetine (PROZAC) 40 MG capsule; Take 1 capsule by mouth Daily.    Hyperlipidemia LDL goal <70  -     atorvastatin (LIPITOR) 40 MG tablet; Take 1 tablet by mouth Every Night for 360 days.    Chronic systolic heart failure  -     potassium chloride (K-DUR,KLOR-CON) 20 MEQ CR tablet; Take 1 tablet by mouth Daily.  -     furosemide (LASIX) 20 MG tablet; 1-2 tablets as needed for swelling.    Essential hypertension  -     lisinopril (PRINIVIL,ZESTRIL) 2.5 MG tablet; Take 1 tablet by mouth Daily for 360 days.    Lumbar radicular pain  Comments:  Gabapentin refilled, educated patient on controlled nature of medicine.    Cervical radicular pain  Comments:  BELKYS requested.    Moderate single current episode of major depressive disorder  Comments:  Increased prozac from 20 to 40 mg to gain better control on anxiety.  Orders:  -     clonazePAM (KlonoPIN) 0.5 MG tablet; Take 1 tablet by mouth 2 (Two) Times a Day As Needed for Anxiety.    Other orders  -     FLUoxetine (PROZAC) 20 MG capsule; Take 1 capsule by mouth Daily.        Klonopin refilled by Dr. Holly after BELKYS reviewed. Encouraged to use sparingly.

## 2017-10-26 ENCOUNTER — TELEPHONE (OUTPATIENT)
Dept: INTERNAL MEDICINE | Facility: CLINIC | Age: 37
End: 2017-10-26

## 2017-11-06 ENCOUNTER — APPOINTMENT (OUTPATIENT)
Dept: GENERAL RADIOLOGY | Facility: HOSPITAL | Age: 37
End: 2017-11-06

## 2017-11-06 ENCOUNTER — HOSPITAL ENCOUNTER (EMERGENCY)
Facility: HOSPITAL | Age: 37
Discharge: HOME | End: 2017-11-06
Attending: EMERGENCY MEDICINE | Admitting: EMERGENCY MEDICINE

## 2017-11-06 VITALS
SYSTOLIC BLOOD PRESSURE: 116 MMHG | HEART RATE: 88 BPM | DIASTOLIC BLOOD PRESSURE: 73 MMHG | TEMPERATURE: 98.2 F | BODY MASS INDEX: 35 KG/M2 | RESPIRATION RATE: 18 BRPM | HEIGHT: 71 IN | OXYGEN SATURATION: 97 % | WEIGHT: 250 LBS

## 2017-11-06 DIAGNOSIS — R11.2 NAUSEA AND VOMITING, INTRACTABILITY OF VOMITING NOT SPECIFIED, UNSPECIFIED VOMITING TYPE: ICD-10-CM

## 2017-11-06 DIAGNOSIS — R07.9 CHEST PAIN, UNSPECIFIED TYPE: Primary | ICD-10-CM

## 2017-11-06 LAB
ALBUMIN SERPL-MCNC: 4.6 G/DL (ref 3.2–4.8)
ALBUMIN/GLOB SERPL: 1.6 G/DL (ref 1.5–2.5)
ALP SERPL-CCNC: 68 U/L (ref 25–100)
ALT SERPL W P-5'-P-CCNC: 20 U/L (ref 7–40)
ANION GAP SERPL CALCULATED.3IONS-SCNC: 9 MMOL/L (ref 3–11)
AST SERPL-CCNC: 18 U/L (ref 0–33)
BASOPHILS # BLD AUTO: 0.03 10*3/MM3 (ref 0–0.2)
BASOPHILS NFR BLD AUTO: 0.3 % (ref 0–1)
BILIRUB SERPL-MCNC: 0.6 MG/DL (ref 0.3–1.2)
BNP SERPL-MCNC: 63 PG/ML (ref 0–100)
BUN BLD-MCNC: 14 MG/DL (ref 9–23)
BUN/CREAT SERPL: 17.5 (ref 7–25)
CALCIUM SPEC-SCNC: 10.2 MG/DL (ref 8.7–10.4)
CHLORIDE SERPL-SCNC: 103 MMOL/L (ref 99–109)
CO2 SERPL-SCNC: 28 MMOL/L (ref 20–31)
CREAT BLD-MCNC: 0.8 MG/DL (ref 0.6–1.3)
DEPRECATED RDW RBC AUTO: 41.6 FL (ref 37–54)
EOSINOPHIL # BLD AUTO: 0.09 10*3/MM3 (ref 0–0.3)
EOSINOPHIL NFR BLD AUTO: 1 % (ref 0–3)
ERYTHROCYTE [DISTWIDTH] IN BLOOD BY AUTOMATED COUNT: 13.1 % (ref 11.3–14.5)
GFR SERPL CREATININE-BSD FRML MDRD: 110 ML/MIN/1.73
GLOBULIN UR ELPH-MCNC: 2.9 GM/DL
GLUCOSE BLD-MCNC: 107 MG/DL (ref 70–100)
HCT VFR BLD AUTO: 45.7 % (ref 38.9–50.9)
HGB BLD-MCNC: 16.3 G/DL (ref 13.1–17.5)
HOLD SPECIMEN: NORMAL
HOLD SPECIMEN: NORMAL
IMM GRANULOCYTES # BLD: 0.01 10*3/MM3 (ref 0–0.03)
IMM GRANULOCYTES NFR BLD: 0.1 % (ref 0–0.6)
LIPASE SERPL-CCNC: 33 U/L (ref 6–51)
LYMPHOCYTES # BLD AUTO: 2.26 10*3/MM3 (ref 0.6–4.8)
LYMPHOCYTES NFR BLD AUTO: 26.1 % (ref 24–44)
MCH RBC QN AUTO: 30.9 PG (ref 27–31)
MCHC RBC AUTO-ENTMCNC: 35.7 G/DL (ref 32–36)
MCV RBC AUTO: 86.7 FL (ref 80–99)
MONOCYTES # BLD AUTO: 0.75 10*3/MM3 (ref 0–1)
MONOCYTES NFR BLD AUTO: 8.7 % (ref 0–12)
NEUTROPHILS # BLD AUTO: 5.52 10*3/MM3 (ref 1.5–8.3)
NEUTROPHILS NFR BLD AUTO: 63.8 % (ref 41–71)
PLATELET # BLD AUTO: 259 10*3/MM3 (ref 150–450)
PMV BLD AUTO: 9.7 FL (ref 6–12)
POTASSIUM BLD-SCNC: 3.5 MMOL/L (ref 3.5–5.5)
PROT SERPL-MCNC: 7.5 G/DL (ref 5.7–8.2)
RBC # BLD AUTO: 5.27 10*6/MM3 (ref 4.2–5.76)
SODIUM BLD-SCNC: 140 MMOL/L (ref 132–146)
TROPONIN I SERPL-MCNC: 0 NG/ML (ref 0–0.07)
TROPONIN I SERPL-MCNC: 0 NG/ML (ref 0–0.07)
WBC NRBC COR # BLD: 8.66 10*3/MM3 (ref 3.5–10.8)
WHOLE BLOOD HOLD SPECIMEN: NORMAL
WHOLE BLOOD HOLD SPECIMEN: NORMAL

## 2017-11-06 PROCEDURE — 84484 ASSAY OF TROPONIN QUANT: CPT

## 2017-11-06 PROCEDURE — 93005 ELECTROCARDIOGRAM TRACING: CPT | Performed by: EMERGENCY MEDICINE

## 2017-11-06 PROCEDURE — 25010000002 ONDANSETRON PER 1 MG: Performed by: EMERGENCY MEDICINE

## 2017-11-06 PROCEDURE — 96375 TX/PRO/DX INJ NEW DRUG ADDON: CPT

## 2017-11-06 PROCEDURE — 96374 THER/PROPH/DIAG INJ IV PUSH: CPT

## 2017-11-06 PROCEDURE — 83880 ASSAY OF NATRIURETIC PEPTIDE: CPT | Performed by: EMERGENCY MEDICINE

## 2017-11-06 PROCEDURE — 99284 EMERGENCY DEPT VISIT MOD MDM: CPT

## 2017-11-06 PROCEDURE — 71010 HC CHEST PA OR AP: CPT

## 2017-11-06 PROCEDURE — 85025 COMPLETE CBC W/AUTO DIFF WBC: CPT | Performed by: EMERGENCY MEDICINE

## 2017-11-06 PROCEDURE — 83690 ASSAY OF LIPASE: CPT | Performed by: EMERGENCY MEDICINE

## 2017-11-06 PROCEDURE — 36415 COLL VENOUS BLD VENIPUNCTURE: CPT

## 2017-11-06 PROCEDURE — 80053 COMPREHEN METABOLIC PANEL: CPT | Performed by: EMERGENCY MEDICINE

## 2017-11-06 RX ORDER — ASPIRIN 81 MG/1
243 TABLET, CHEWABLE ORAL ONCE
Status: COMPLETED | OUTPATIENT
Start: 2017-11-06 | End: 2017-11-06

## 2017-11-06 RX ORDER — ONDANSETRON 2 MG/ML
4 INJECTION INTRAMUSCULAR; INTRAVENOUS ONCE
Status: COMPLETED | OUTPATIENT
Start: 2017-11-06 | End: 2017-11-06

## 2017-11-06 RX ORDER — SERTRALINE HYDROCHLORIDE 100 MG/1
150 TABLET, FILM COATED ORAL DAILY
COMMUNITY
End: 2017-11-21

## 2017-11-06 RX ORDER — FAMOTIDINE 10 MG/ML
20 INJECTION, SOLUTION INTRAVENOUS ONCE
Status: COMPLETED | OUTPATIENT
Start: 2017-11-06 | End: 2017-11-06

## 2017-11-06 RX ORDER — ONDANSETRON 4 MG/1
4 TABLET, ORALLY DISINTEGRATING ORAL EVERY 6 HOURS PRN
Qty: 15 TABLET | Refills: 0 | Status: SHIPPED | OUTPATIENT
Start: 2017-11-06 | End: 2018-08-08 | Stop reason: SDUPTHER

## 2017-11-06 RX ORDER — SODIUM CHLORIDE 0.9 % (FLUSH) 0.9 %
10 SYRINGE (ML) INJECTION AS NEEDED
Status: DISCONTINUED | OUTPATIENT
Start: 2017-11-06 | End: 2017-11-06 | Stop reason: HOSPADM

## 2017-11-06 RX ORDER — SUCRALFATE 1 G/1
1 TABLET ORAL
Qty: 60 TABLET | Refills: 0 | Status: SHIPPED | OUTPATIENT
Start: 2017-11-06 | End: 2017-11-21

## 2017-11-06 RX ORDER — ASPIRIN 81 MG/1
324 TABLET, CHEWABLE ORAL ONCE
Status: DISCONTINUED | OUTPATIENT
Start: 2017-11-06 | End: 2017-11-06

## 2017-11-06 RX ORDER — RANITIDINE 150 MG/1
150 TABLET ORAL 2 TIMES DAILY
Qty: 30 TABLET | Refills: 0 | Status: SHIPPED | OUTPATIENT
Start: 2017-11-06 | End: 2017-11-21

## 2017-11-06 RX ADMIN — FAMOTIDINE 20 MG: 10 INJECTION, SOLUTION INTRAVENOUS at 12:26

## 2017-11-06 RX ADMIN — ONDANSETRON 4 MG: 2 INJECTION INTRAMUSCULAR; INTRAVENOUS at 12:22

## 2017-11-06 RX ADMIN — ASPIRIN 81 MG 243 MG: 81 TABLET ORAL at 12:22

## 2017-11-06 NOTE — DISCHARGE INSTRUCTIONS
Please call ASAP to arrange outpatient follow up with one of the following gastroenterologists:    Sharad Buckner MD or Sami Jeffrey MD  [address]  [place] [address] [phone]    Or    If unable to make a timely appointment with Dr. Buckner or Dr. Jeffrey, please follow up with one of these gastroenterologists:    Everardo Allen MD or Paul Law MD  [address]  [address]  [place] [address] [phone]    If follow up with these specialists cannot be arranged soon, please also follow up with a primary care provider, next available.

## 2017-11-10 RX ORDER — FLUOXETINE HYDROCHLORIDE 20 MG/1
20 CAPSULE ORAL DAILY
Qty: 90 CAPSULE | Refills: 1 | Status: SHIPPED | OUTPATIENT
Start: 2017-11-10 | End: 2017-11-21

## 2017-11-21 ENCOUNTER — OFFICE VISIT (OUTPATIENT)
Dept: INTERNAL MEDICINE | Facility: CLINIC | Age: 37
End: 2017-11-21

## 2017-11-21 VITALS
BODY MASS INDEX: 37.1 KG/M2 | SYSTOLIC BLOOD PRESSURE: 134 MMHG | HEIGHT: 71 IN | OXYGEN SATURATION: 97 % | WEIGHT: 265 LBS | DIASTOLIC BLOOD PRESSURE: 78 MMHG | TEMPERATURE: 98.3 F | RESPIRATION RATE: 12 BRPM | HEART RATE: 100 BPM

## 2017-11-21 DIAGNOSIS — F41.9 ANXIETY: ICD-10-CM

## 2017-11-21 DIAGNOSIS — R11.0 NAUSEA: ICD-10-CM

## 2017-11-21 DIAGNOSIS — F32.1 MODERATE SINGLE CURRENT EPISODE OF MAJOR DEPRESSIVE DISORDER (HCC): ICD-10-CM

## 2017-11-21 DIAGNOSIS — R10.13 EPIGASTRIC ABDOMINAL PAIN: ICD-10-CM

## 2017-11-21 DIAGNOSIS — R11.10 RECURRENT VOMITING: Primary | ICD-10-CM

## 2017-11-21 PROCEDURE — 99214 OFFICE O/P EST MOD 30 MIN: CPT | Performed by: FAMILY MEDICINE

## 2017-11-21 RX ORDER — VENLAFAXINE HYDROCHLORIDE 37.5 MG/1
1 CAPSULE, EXTENDED RELEASE ORAL DAILY
COMMUNITY
Start: 2017-11-08 | End: 2017-11-21 | Stop reason: SDUPTHER

## 2017-11-21 RX ORDER — VENLAFAXINE HYDROCHLORIDE 37.5 MG/1
37.5 CAPSULE, EXTENDED RELEASE ORAL DAILY
Qty: 30 CAPSULE | Refills: 5 | Status: SHIPPED | OUTPATIENT
Start: 2017-11-21 | End: 2018-08-08 | Stop reason: SDUPTHER

## 2017-11-21 RX ORDER — AMITRIPTYLINE HYDROCHLORIDE 10 MG/1
2 TABLET, FILM COATED ORAL DAILY
COMMUNITY
Start: 2017-11-08 | End: 2017-11-21 | Stop reason: SDUPTHER

## 2017-11-21 RX ORDER — AMITRIPTYLINE HYDROCHLORIDE 10 MG/1
20 TABLET, FILM COATED ORAL DAILY
Qty: 60 TABLET | Refills: 5 | Status: SHIPPED | OUTPATIENT
Start: 2017-11-21 | End: 2018-08-08 | Stop reason: SDUPTHER

## 2017-11-21 RX ORDER — CLONAZEPAM 0.5 MG/1
0.5 TABLET ORAL 2 TIMES DAILY PRN
Qty: 30 TABLET | Refills: 1 | Status: SHIPPED | OUTPATIENT
Start: 2017-11-21 | End: 2018-08-08 | Stop reason: SDUPTHER

## 2017-11-21 RX ORDER — PROMETHAZINE HYDROCHLORIDE 25 MG/1
25 TABLET ORAL EVERY 6 HOURS PRN
Qty: 60 TABLET | Refills: 2 | Status: SHIPPED | OUTPATIENT
Start: 2017-11-21 | End: 2018-08-08 | Stop reason: SDUPTHER

## 2017-11-21 RX ORDER — MIRTAZAPINE 30 MG/1
1 TABLET, FILM COATED ORAL NIGHTLY
COMMUNITY
Start: 2017-11-08 | End: 2017-11-21

## 2017-11-21 NOTE — PROGRESS NOTES
"Subjective    Kenneth Aguilar is a 36 y.o. male here for:  Chief Complaint   Patient presents with   • Follow-up     Follow up after hospital stay at [facility] due to excessive vomiting   • Vomiting     History of Present Illness   He's been told by hospital doctor he may have cyclic vomiting syndrome. Has pain in upper abdomen with bloating. No history of pancreatitis. He's had this for several years. Goes away for few months then comes back. No bright red blood per rectum and no melena. He sometimes sees some blood when he's been \"throwing up for the better part of the day.\"  Last colonoscopy was 2014, last upper scope was around the same time. Sucralfate not helping. Phenergan helps a bit more than Zofran but still not resolving nausea.     Was taken off Prozac and mood got a bit better. Still has a lot of stress and anxiety. Son is hosptialized again . Was put on Zoloft. He felt a little better, no SI with it, but was changed to Effexor and Elavil last hospitalization as it \"may be easier on his stomach.\" He has not found that to be the case.     The following portions of the patient's history were reviewed and updated as appropriate: allergies, current medications, past family history, past medical history, past social history, past surgical history and problem list.    Review of Systems   Constitutional: Positive for activity change, appetite change and unexpected weight change.   Gastrointestinal: Positive for abdominal pain, nausea and vomiting. Negative for blood in stool.   Psychiatric/Behavioral: Positive for dysphoric mood. The patient is nervous/anxious.         Stress       Vitals:    11/21/17 1355   BP: 134/78   Pulse: 100   Resp: 12   Temp: 98.3 °F (36.8 °C)   SpO2: 97%       Objective   Physical Exam   Constitutional: He is oriented to person, place, and time. Vital signs are normal. He appears well-developed and well-nourished. He is active. He does not appear ill.   Appears " stated age. Well groomed. Extremely Obese.   HENT:   Head: Normocephalic and atraumatic.   Right Ear: Hearing normal.   Left Ear: Hearing normal.   Nose: Nose normal.   Eyes: EOM and lids are normal. Pupils are equal, round, and reactive to light. No scleral icterus.   Cardiovascular: Normal rate, regular rhythm and normal heart sounds.    Pulmonary/Chest: Effort normal and breath sounds normal.   Abdominal: Soft. Bowel sounds are normal. There is no tenderness. There is no rigidity, no rebound and no guarding.   Exam limited by obesity.   Neurological: He is alert and oriented to person, place, and time. No cranial nerve deficit. Gait normal.   Skin: Skin is warm. He is not diaphoretic. No cyanosis. Nails show no clubbing.   Psychiatric: His speech is normal and behavior is normal. Judgment and thought content normal. His mood appears anxious. Cognition and memory are normal.   Nursing note and vitals reviewed.      Reviewed ED notes from 10/31/17. More recent ED notes requested. CT abdomen/pelvis was not worrisome on 11/3/17    Assessment/Plan   Kenneth was seen today for follow-up and vomiting.    Diagnoses and all orders for this visit:    Recurrent vomiting  -     Ambulatory Referral to Gastroenterology    Epigastric abdominal pain  -     Ambulatory Referral to Gastroenterology    Nausea  -     Ambulatory Referral to Gastroenterology  -     promethazine (PHENERGAN) 25 MG tablet; Take 1 tablet by mouth Every 6 (Six) Hours As Needed for Nausea or Vomiting.    Moderate single current episode of major depressive disorder  -     clonazePAM (KlonoPIN) 0.5 MG tablet; Take 1 tablet by mouth 2 (Two) Times a Day As Needed for Anxiety.  -     venlafaxine XR (EFFEXOR-XR) 37.5 MG 24 hr capsule; Take 1 capsule by mouth Daily.  -     amitriptyline (ELAVIL) 10 MG tablet; Take 2 tablets by mouth Daily.    Anxiety  -     clonazePAM (KlonoPIN) 0.5 MG tablet; Take 1 tablet by mouth 2 (Two) Times a Day As Needed for  Anxiety.      · Reminded patient that clonazepam is not a good long term medicine for anxiety, will need to taper off.  · ED records requested.            Shawna Holly MD

## 2017-12-06 ENCOUNTER — OFFICE VISIT (OUTPATIENT)
Dept: INTERNAL MEDICINE | Facility: CLINIC | Age: 37
End: 2017-12-06

## 2017-12-06 VITALS
HEART RATE: 87 BPM | RESPIRATION RATE: 12 BRPM | BODY MASS INDEX: 37.24 KG/M2 | SYSTOLIC BLOOD PRESSURE: 122 MMHG | TEMPERATURE: 98.5 F | DIASTOLIC BLOOD PRESSURE: 78 MMHG | HEIGHT: 71 IN | OXYGEN SATURATION: 95 % | WEIGHT: 266 LBS

## 2017-12-06 DIAGNOSIS — R11.15 INTRACTABLE CYCLICAL VOMITING WITH NAUSEA: Primary | ICD-10-CM

## 2017-12-06 PROCEDURE — 99213 OFFICE O/P EST LOW 20 MIN: CPT | Performed by: FAMILY MEDICINE

## 2017-12-06 RX ORDER — METOCLOPRAMIDE 5 MG/1
5 TABLET ORAL 3 TIMES DAILY
Qty: 60 TABLET | Refills: 0 | Status: SHIPPED | OUTPATIENT
Start: 2017-12-06 | End: 2018-08-08 | Stop reason: SDUPTHER

## 2017-12-08 NOTE — PROGRESS NOTES
Subjective    Kenneth Aguilar is a 36 y.o. male here for:  Chief Complaint   Patient presents with   • Vomiting     Since Monday     History of Present Illness     Mr. Aguilar was seen again recently in ER due to intractable nausea and vomiting. He's been referred to GI for further evaluation. Has pain at times left upper abdomen that comes and goes. Zofran helps some with nausea. Appetite is down with these spells.     The following portions of the patient's history were reviewed and updated as appropriate: allergies, current medications, past family history, past medical history, past social history, past surgical history and problem list.    Review of Systems   Gastrointestinal: Positive for abdominal pain. Negative for blood in stool.   Psychiatric/Behavioral: The patient is nervous/anxious.        Vitals:    12/06/17 1201   BP: 122/78   Pulse: 87   Resp: 12   Temp: 98.5 °F (36.9 °C)   SpO2: 95%         Objective   Physical Exam   Constitutional: He is oriented to person, place, and time. Vital signs are normal. He appears well-developed and well-nourished. He is active. He does not appear ill.   Appears stated age. Well groomed. Extremely Obese.   HENT:   Head: Normocephalic and atraumatic.   Right Ear: Hearing normal.   Left Ear: Hearing normal.   Nose: Nose normal.   Eyes: EOM and lids are normal. Pupils are equal, round, and reactive to light. No scleral icterus.   Cardiovascular: Normal rate, regular rhythm and normal heart sounds.    Pulmonary/Chest: Effort normal and breath sounds normal.   Abdominal: Soft. Bowel sounds are normal. There is no tenderness. There is no rigidity, no rebound and no guarding.   Exam limited by obesity.   Neurological: He is alert and oriented to person, place, and time. No cranial nerve deficit. Gait normal.   Skin: Skin is warm. He is not diaphoretic. No cyanosis. Nails show no clubbing.   Psychiatric: His speech is normal and behavior is normal. Judgment and thought  content normal. His mood appears anxious. Cognition and memory are normal.   Nursing note and vitals reviewed.          Assessment     Kenneth was seen today for vomiting.    Diagnoses and all orders for this visit:    Intractable cyclical vomiting with nausea  -     metoclopramide (REGLAN) 5 MG tablet; Take 1 tablet by mouth 3 (Three) Times a Day.        · Follow up with GI as scheduled. Stay hydrated. Monitor for side effects of reglan.      Shawna Holly MD

## 2018-08-08 ENCOUNTER — OFFICE VISIT (OUTPATIENT)
Dept: INTERNAL MEDICINE | Facility: CLINIC | Age: 38
End: 2018-08-08

## 2018-08-08 VITALS
HEART RATE: 87 BPM | HEIGHT: 71 IN | OXYGEN SATURATION: 97 % | SYSTOLIC BLOOD PRESSURE: 125 MMHG | DIASTOLIC BLOOD PRESSURE: 80 MMHG | TEMPERATURE: 98.6 F

## 2018-08-08 DIAGNOSIS — E78.5 HYPERLIPIDEMIA LDL GOAL <70: ICD-10-CM

## 2018-08-08 DIAGNOSIS — R10.12 LUQ ABDOMINAL PAIN: ICD-10-CM

## 2018-08-08 DIAGNOSIS — I50.22 CHRONIC SYSTOLIC HEART FAILURE (HCC): ICD-10-CM

## 2018-08-08 DIAGNOSIS — I10 ESSENTIAL HYPERTENSION: ICD-10-CM

## 2018-08-08 DIAGNOSIS — F32.1 MODERATE SINGLE CURRENT EPISODE OF MAJOR DEPRESSIVE DISORDER (HCC): ICD-10-CM

## 2018-08-08 DIAGNOSIS — R10.13 EPIGASTRIC ABDOMINAL PAIN: ICD-10-CM

## 2018-08-08 DIAGNOSIS — M54.12 CERVICAL RADICULAR PAIN: ICD-10-CM

## 2018-08-08 DIAGNOSIS — K21.9 GASTROESOPHAGEAL REFLUX DISEASE, ESOPHAGITIS PRESENCE NOT SPECIFIED: ICD-10-CM

## 2018-08-08 DIAGNOSIS — R11.15 INTRACTABLE CYCLICAL VOMITING WITH NAUSEA: ICD-10-CM

## 2018-08-08 DIAGNOSIS — R73.9 HYPERGLYCEMIA: ICD-10-CM

## 2018-08-08 DIAGNOSIS — G89.4 CHRONIC PAIN SYNDROME: ICD-10-CM

## 2018-08-08 DIAGNOSIS — F41.9 ANXIETY: ICD-10-CM

## 2018-08-08 DIAGNOSIS — I25.110 CORONARY ARTERY DISEASE INVOLVING NATIVE CORONARY ARTERY OF NATIVE HEART WITH UNSTABLE ANGINA PECTORIS (HCC): ICD-10-CM

## 2018-08-08 DIAGNOSIS — M54.16 LUMBAR RADICULAR PAIN: ICD-10-CM

## 2018-08-08 DIAGNOSIS — R11.0 NAUSEA: Primary | ICD-10-CM

## 2018-08-08 PROCEDURE — 99214 OFFICE O/P EST MOD 30 MIN: CPT | Performed by: FAMILY MEDICINE

## 2018-08-08 RX ORDER — ATORVASTATIN CALCIUM 40 MG/1
40 TABLET, FILM COATED ORAL NIGHTLY
Qty: 90 TABLET | Refills: 3 | Status: SHIPPED | OUTPATIENT
Start: 2018-08-08

## 2018-08-08 RX ORDER — VENLAFAXINE HYDROCHLORIDE 75 MG/1
75 CAPSULE, EXTENDED RELEASE ORAL DAILY
Qty: 90 CAPSULE | Refills: 3 | OUTPATIENT
Start: 2018-08-08 | End: 2019-04-02 | Stop reason: HOSPADM

## 2018-08-08 RX ORDER — FUROSEMIDE 20 MG/1
TABLET ORAL
Qty: 180 TABLET | Refills: 3 | Status: SHIPPED | OUTPATIENT
Start: 2018-08-08

## 2018-08-08 RX ORDER — METOPROLOL SUCCINATE 25 MG/1
25 TABLET, EXTENDED RELEASE ORAL
Qty: 90 TABLET | Refills: 3 | Status: SHIPPED | OUTPATIENT
Start: 2018-08-08

## 2018-08-08 RX ORDER — PANTOPRAZOLE SODIUM 40 MG/1
40 TABLET, DELAYED RELEASE ORAL DAILY
Qty: 90 TABLET | Refills: 3 | Status: SHIPPED | OUTPATIENT
Start: 2018-08-08

## 2018-08-08 RX ORDER — CLONAZEPAM 0.5 MG/1
0.5 TABLET ORAL 2 TIMES DAILY PRN
Qty: 30 TABLET | Refills: 1 | Status: SHIPPED | OUTPATIENT
Start: 2018-08-08

## 2018-08-08 RX ORDER — AMITRIPTYLINE HYDROCHLORIDE 10 MG/1
20 TABLET, FILM COATED ORAL DAILY
Qty: 180 TABLET | Refills: 3 | Status: SHIPPED | OUTPATIENT
Start: 2018-08-08

## 2018-08-08 RX ORDER — PROMETHAZINE HYDROCHLORIDE 25 MG/1
25 TABLET ORAL EVERY 6 HOURS PRN
Qty: 60 TABLET | Refills: 2 | Status: SHIPPED | OUTPATIENT
Start: 2018-08-08 | End: 2019-01-16

## 2018-08-08 RX ORDER — GABAPENTIN 300 MG/1
300 CAPSULE ORAL 3 TIMES DAILY
Qty: 270 CAPSULE | Refills: 1 | Status: SHIPPED | OUTPATIENT
Start: 2018-08-08

## 2018-08-08 RX ORDER — POTASSIUM CHLORIDE 20 MEQ/1
20 TABLET, EXTENDED RELEASE ORAL DAILY
Qty: 90 TABLET | Refills: 3 | Status: SHIPPED | OUTPATIENT
Start: 2018-08-08

## 2018-08-08 RX ORDER — METOCLOPRAMIDE 5 MG/1
5 TABLET ORAL 3 TIMES DAILY
Qty: 270 TABLET | Refills: 3 | Status: SHIPPED | OUTPATIENT
Start: 2018-08-08

## 2018-08-08 RX ORDER — LISINOPRIL 2.5 MG/1
2.5 TABLET ORAL DAILY
Qty: 90 TABLET | Refills: 3 | Status: SHIPPED | OUTPATIENT
Start: 2018-08-08

## 2018-08-08 RX ORDER — ONDANSETRON 4 MG/1
4 TABLET, ORALLY DISINTEGRATING ORAL EVERY 6 HOURS PRN
Qty: 30 TABLET | Refills: 1 | Status: SHIPPED | OUTPATIENT
Start: 2018-08-08

## 2018-08-09 LAB
ALBUMIN SERPL-MCNC: 4.2 G/DL (ref 3.5–5)
ALBUMIN/GLOB SERPL: 1.4 G/DL (ref 1–2)
ALP SERPL-CCNC: 57 U/L (ref 38–126)
ALT SERPL-CCNC: 30 U/L (ref 13–69)
AMYLASE SERPL-CCNC: 40 U/L (ref 30–110)
AST SERPL-CCNC: 28 U/L (ref 15–46)
BASOPHILS # BLD AUTO: 0.06 10*3/MM3 (ref 0–0.2)
BASOPHILS NFR BLD AUTO: 0.6 % (ref 0–2.5)
BILIRUB SERPL-MCNC: 0.6 MG/DL (ref 0.2–1.3)
BUN SERPL-MCNC: 17 MG/DL (ref 7–20)
BUN/CREAT SERPL: 24.3 (ref 6.3–21.9)
CALCIUM SERPL-MCNC: 9.8 MG/DL (ref 8.4–10.2)
CHLORIDE SERPL-SCNC: 103 MMOL/L (ref 98–107)
CO2 SERPL-SCNC: 27 MMOL/L (ref 26–30)
CREAT SERPL-MCNC: 0.7 MG/DL (ref 0.6–1.3)
EOSINOPHIL # BLD AUTO: 0.35 10*3/MM3 (ref 0–0.7)
EOSINOPHIL NFR BLD AUTO: 3.8 % (ref 0–7)
ERYTHROCYTE [DISTWIDTH] IN BLOOD BY AUTOMATED COUNT: 12.7 % (ref 11.5–14.5)
GLOBULIN SER CALC-MCNC: 3 GM/DL
GLUCOSE SERPL-MCNC: 93 MG/DL (ref 74–98)
HBA1C MFR BLD: 5.5 %
HCT VFR BLD AUTO: 46.3 % (ref 42–52)
HGB BLD-MCNC: 15.8 G/DL (ref 14–18)
IMM GRANULOCYTES # BLD: 0.03 10*3/MM3 (ref 0–0.06)
IMM GRANULOCYTES NFR BLD: 0.3 % (ref 0–0.6)
IRON SATN MFR SERPL: 31 % (ref 11–46)
IRON SERPL-MCNC: 119 MCG/DL (ref 37–181)
LIPASE SERPL-CCNC: 34 U/L (ref 23–300)
LYMPHOCYTES # BLD AUTO: 2.65 10*3/MM3 (ref 0.6–3.4)
LYMPHOCYTES NFR BLD AUTO: 28.5 % (ref 10–50)
MCH RBC QN AUTO: 30.3 PG (ref 27–31)
MCHC RBC AUTO-ENTMCNC: 34.1 G/DL (ref 30–37)
MCV RBC AUTO: 88.9 FL (ref 80–94)
MONOCYTES # BLD AUTO: 0.77 10*3/MM3 (ref 0–0.9)
MONOCYTES NFR BLD AUTO: 8.3 % (ref 0–12)
NEUTROPHILS # BLD AUTO: 5.44 10*3/MM3 (ref 2–6.9)
NEUTROPHILS NFR BLD AUTO: 58.5 % (ref 37–80)
NRBC BLD AUTO-RTO: 0 /100 WBC (ref 0–0)
PLATELET # BLD AUTO: 239 10*3/MM3 (ref 130–400)
POTASSIUM SERPL-SCNC: 4.4 MMOL/L (ref 3.5–5.1)
PROT SERPL-MCNC: 7.2 G/DL (ref 6.3–8.2)
PTH-INTACT SERPL-MCNC: 26 PG/ML (ref 15–65)
RBC # BLD AUTO: 5.21 10*6/MM3 (ref 4.7–6.1)
SODIUM SERPL-SCNC: 138 MMOL/L (ref 137–145)
TIBC SERPL-MCNC: 380 MCG/DL (ref 261–497)
UIBC SERPL-MCNC: 261 MCG/DL
WBC # BLD AUTO: 9.3 10*3/MM3 (ref 4.8–10.8)

## 2018-08-10 PROBLEM — R11.15 INTRACTABLE CYCLICAL VOMITING WITH NAUSEA: Status: ACTIVE | Noted: 2018-08-10

## 2018-08-10 PROBLEM — I10 ESSENTIAL HYPERTENSION: Status: ACTIVE | Noted: 2018-08-10

## 2018-08-11 NOTE — PROGRESS NOTES
"Subjective    Kenneth Aguilar is a 37 y.o. male here for:  Chief Complaint   Patient presents with   • Vomiting     Complaints of vomiting. Patient states that this has been on going since thursday.   • Med Refill     Patient states that he needs a refill on all medications.     History of Present Illness     Patient reports being in ER earlier this week at [facility] in [place] due to nausea and vomiting. Notes this does not feel cardiac in origin, says he had labs that were okay. He was not seen by Gastroenterology previously as referred as he says they \"played phone tag\" and he gave up. Appetite is down, pain is in upper abdomen and radiates toward back.     Also needs his medicines for anxiety refilled. Anxiety does not feel under control on current dose of Effexor and he's agreeable to a dose increase. He still takes the benzo as needed.    Coronary artery disease, chronic issue, no changes since last visit. Maintained on dual antiplatelet therapy along with high intensity statin. He continues to smoke, though. Taking diuretic due to CHF.    He continues gabapentin for his chronic radicular pain. Continues to help.    The following portions of the patient's history were reviewed and updated as appropriate: allergies, current medications, past family history, past medical history, past social history, past surgical history and problem list.    Review of Systems   Constitutional: Positive for activity change, appetite change and fatigue.   Cardiovascular: Negative for chest pain.   Gastrointestinal: Positive for abdominal pain, blood in stool, diarrhea, nausea, vomiting, GERD and indigestion.   Musculoskeletal: Positive for arthralgias and back pain.   Psychiatric/Behavioral: Positive for sleep disturbance, depressed mood and stress. The patient is nervous/anxious.        Vitals:    08/08/18 1205   BP: 125/80   Pulse: 87   Temp: 98.6 °F (37 °C)   SpO2: 97%   Height: 180.3 cm (70.98\")         Objective "   Physical Exam   Constitutional: He is oriented to person, place, and time. Vital signs are normal. He appears well-developed and well-nourished. He is active.  Non-toxic appearance. No distress. He is obese.  HENT:   Head: Normocephalic and atraumatic.   Right Ear: Hearing normal.   Left Ear: Hearing normal.   Mouth/Throat: Mucous membranes are not dry.   Eyes: EOM are normal. No scleral icterus.   Neck: Phonation normal. Neck supple.   Cardiovascular: Normal rate, regular rhythm and normal heart sounds.  Exam reveals no gallop and no friction rub.    No murmur heard.  Pulmonary/Chest: Effort normal and breath sounds normal.   Abdominal: Soft. Bowel sounds are normal. He exhibits no mass. There is tenderness in the epigastric area and left upper quadrant. There is no rigidity, no rebound and no guarding.   Neurological: He is alert and oriented to person, place, and time. He displays no tremor. No cranial nerve deficit. Gait abnormal.   Skin: Skin is warm. He is not diaphoretic.   Psychiatric: His speech is normal and behavior is normal. Judgment and thought content normal. His mood appears anxious. Cognition and memory are normal.   Nursing note and vitals reviewed.        Assessment/Plan     Problem List Items Addressed This Visit        Cardiovascular and Mediastinum    Hyperlipidemia LDL goal <70    Overview     · High intensity statin therapy indicated         Relevant Medications    atorvastatin (LIPITOR) 40 MG tablet    Chronic systolic heart failure (CMS/HCC)    Overview     · Echocardiogram at [facility] (5/14/2017): LVEF 40-45%         Relevant Medications    potassium chloride (K-DUR,KLOR-CON) 20 MEQ CR tablet    metoprolol succinate XL (TOPROL-XL) 25 MG 24 hr tablet    lisinopril (PRINIVIL,ZESTRIL) 2.5 MG tablet    furosemide (LASIX) 20 MG tablet    Essential hypertension    Relevant Medications    potassium chloride (K-DUR,KLOR-CON) 20 MEQ CR tablet    metoprolol succinate XL (TOPROL-XL) 25 MG 24 hr tablet     lisinopril (PRINIVIL,ZESTRIL) 2.5 MG tablet    furosemide (LASIX) 20 MG tablet       Digestive    GERD (gastroesophageal reflux disease)    Relevant Medications    pantoprazole (PROTONIX) 40 MG EC tablet    metoclopramide (REGLAN) 5 MG tablet    Other Relevant Orders    Ambulatory Referral to Gastroenterology    Comprehensive Metabolic Panel (Completed)    Intractable cyclical vomiting with nausea    Relevant Medications    pantoprazole (PROTONIX) 40 MG EC tablet    metoclopramide (REGLAN) 5 MG tablet    Other Relevant Orders    Ambulatory Referral to Gastroenterology       Nervous and Auditory    Cervical radicular pain    Relevant Medications    gabapentin (NEURONTIN) 300 MG capsule    Lumbar radicular pain    Relevant Medications    gabapentin (NEURONTIN) 300 MG capsule       Other    Anxiety    Relevant Medications    venlafaxine XR (EFFEXOR-XR) 75 MG 24 hr capsule    clonazePAM (KlonoPIN) 0.5 MG tablet    Moderate single current episode of major depressive disorder (CMS/HCC)    Relevant Medications    venlafaxine XR (EFFEXOR-XR) 75 MG 24 hr capsule    amitriptyline (ELAVIL) 10 MG tablet    Chronic pain syndrome    Relevant Medications    gabapentin (NEURONTIN) 300 MG capsule    amitriptyline (ELAVIL) 10 MG tablet      Other Visit Diagnoses     Nausea    -  Primary    Relevant Medications    promethazine (PHENERGAN) 25 MG tablet    ondansetron ODT (ZOFRAN-ODT) 4 MG disintegrating tablet    metoclopramide (REGLAN) 5 MG tablet    Other Relevant Orders    Ambulatory Referral to Gastroenterology    CBC & Differential (Completed)    Comprehensive Metabolic Panel (Completed)    Epigastric abdominal pain        Relevant Orders    Ambulatory Referral to Gastroenterology    CBC & Differential (Completed)    Comprehensive Metabolic Panel (Completed)    Iron Profile (Completed)    PTH, Intact (Completed)    Lipase (Completed)    Amylase (Completed)    LUQ abdominal pain        Relevant Orders    Ambulatory Referral to  Gastroenterology    CBC & Differential (Completed)    Comprehensive Metabolic Panel (Completed)    Iron Profile (Completed)    PTH, Intact (Completed)    Lipase (Completed)    Amylase (Completed)    Coronary artery disease involving native coronary artery of native heart with unstable angina pectoris (CMS/HCC)        Relevant Medications    metoprolol succinate XL (TOPROL-XL) 25 MG 24 hr tablet    lisinopril (PRINIVIL,ZESTRIL) 2.5 MG tablet    atorvastatin (LIPITOR) 40 MG tablet    Hyperglycemia        Relevant Orders    Hemoglobin A1c (Completed)        ·     Return in about 4 weeks (around 9/5/2018) for Follow up on current issues.    Shawna Holly MD

## 2018-08-12 ENCOUNTER — APPOINTMENT (OUTPATIENT)
Dept: CT IMAGING | Facility: HOSPITAL | Age: 38
End: 2018-08-12

## 2018-08-12 ENCOUNTER — APPOINTMENT (OUTPATIENT)
Dept: GENERAL RADIOLOGY | Facility: HOSPITAL | Age: 38
End: 2018-08-12

## 2018-08-12 ENCOUNTER — HOSPITAL ENCOUNTER (EMERGENCY)
Facility: HOSPITAL | Age: 38
Discharge: HOME | End: 2018-08-12
Attending: EMERGENCY MEDICINE | Admitting: EMERGENCY MEDICINE

## 2018-08-12 VITALS
OXYGEN SATURATION: 98 % | RESPIRATION RATE: 16 BRPM | BODY MASS INDEX: 42 KG/M2 | WEIGHT: 300 LBS | DIASTOLIC BLOOD PRESSURE: 91 MMHG | HEIGHT: 71 IN | TEMPERATURE: 98 F | SYSTOLIC BLOOD PRESSURE: 166 MMHG | HEART RATE: 79 BPM

## 2018-08-12 DIAGNOSIS — R10.13 EPIGASTRIC PAIN: Primary | ICD-10-CM

## 2018-08-12 DIAGNOSIS — R11.2 NAUSEA AND VOMITING, INTRACTABILITY OF VOMITING NOT SPECIFIED, UNSPECIFIED VOMITING TYPE: ICD-10-CM

## 2018-08-12 LAB
ALBUMIN SERPL-MCNC: 4.51 G/DL (ref 3.2–4.8)
ALBUMIN/GLOB SERPL: 1.5 G/DL (ref 1.5–2.5)
ALP SERPL-CCNC: 54 U/L (ref 25–100)
ALT SERPL W P-5'-P-CCNC: 24 U/L (ref 7–40)
AMPHET+METHAMPHET UR QL: NEGATIVE
AMPHETAMINES UR QL: NEGATIVE
ANION GAP SERPL CALCULATED.3IONS-SCNC: 4 MMOL/L (ref 3–11)
AST SERPL-CCNC: 21 U/L (ref 0–33)
BACTERIA UR QL AUTO: NORMAL /HPF
BARBITURATES UR QL SCN: NEGATIVE
BASOPHILS # BLD AUTO: 0.04 10*3/MM3 (ref 0–0.2)
BASOPHILS NFR BLD AUTO: 0.4 % (ref 0–1)
BENZODIAZ UR QL SCN: NEGATIVE
BILIRUB SERPL-MCNC: 0.6 MG/DL (ref 0.3–1.2)
BILIRUB UR QL STRIP: ABNORMAL
BNP SERPL-MCNC: 53 PG/ML (ref 0–100)
BUN BLD-MCNC: 17 MG/DL (ref 9–23)
BUN/CREAT SERPL: 21.8 (ref 7–25)
BUPRENORPHINE SERPL-MCNC: NEGATIVE NG/ML
CALCIUM SPEC-SCNC: 9.4 MG/DL (ref 8.7–10.4)
CANNABINOIDS SERPL QL: NEGATIVE
CHLORIDE SERPL-SCNC: 109 MMOL/L (ref 99–109)
CLARITY UR: ABNORMAL
CO2 SERPL-SCNC: 26 MMOL/L (ref 20–31)
COCAINE UR QL: NEGATIVE
COLOR UR: ABNORMAL
CREAT BLD-MCNC: 0.78 MG/DL (ref 0.6–1.3)
DEPRECATED RDW RBC AUTO: 41.8 FL (ref 37–54)
EOSINOPHIL # BLD AUTO: 0.07 10*3/MM3 (ref 0–0.3)
EOSINOPHIL NFR BLD AUTO: 0.7 % (ref 0–3)
ERYTHROCYTE [DISTWIDTH] IN BLOOD BY AUTOMATED COUNT: 12.9 % (ref 11.3–14.5)
GFR SERPL CREATININE-BSD FRML MDRD: 113 ML/MIN/1.73
GLOBULIN UR ELPH-MCNC: 3.1 GM/DL
GLUCOSE BLD-MCNC: 127 MG/DL (ref 70–100)
GLUCOSE UR STRIP-MCNC: NEGATIVE MG/DL
HCT VFR BLD AUTO: 47.5 % (ref 38.9–50.9)
HGB BLD-MCNC: 16.1 G/DL (ref 13.1–17.5)
HGB UR QL STRIP.AUTO: NEGATIVE
HOLD SPECIMEN: NORMAL
HOLD SPECIMEN: NORMAL
HYALINE CASTS UR QL AUTO: NORMAL /LPF
IMM GRANULOCYTES # BLD: 0.02 10*3/MM3 (ref 0–0.03)
IMM GRANULOCYTES NFR BLD: 0.2 % (ref 0–0.6)
KETONES UR QL STRIP: ABNORMAL
LEUKOCYTE ESTERASE UR QL STRIP.AUTO: NEGATIVE
LIPASE SERPL-CCNC: 30 U/L (ref 6–51)
LYMPHOCYTES # BLD AUTO: 2.08 10*3/MM3 (ref 0.6–4.8)
LYMPHOCYTES NFR BLD AUTO: 19.6 % (ref 24–44)
MCH RBC QN AUTO: 30.1 PG (ref 27–31)
MCHC RBC AUTO-ENTMCNC: 33.9 G/DL (ref 32–36)
MCV RBC AUTO: 89 FL (ref 80–99)
METHADONE UR QL SCN: NEGATIVE
MONOCYTES # BLD AUTO: 0.85 10*3/MM3 (ref 0–1)
MONOCYTES NFR BLD AUTO: 8 % (ref 0–12)
NEUTROPHILS # BLD AUTO: 7.55 10*3/MM3 (ref 1.5–8.3)
NEUTROPHILS NFR BLD AUTO: 71.3 % (ref 41–71)
NITRITE UR QL STRIP: NEGATIVE
OPIATES UR QL: NEGATIVE
OXYCODONE UR QL SCN: NEGATIVE
PCP UR QL SCN: NEGATIVE
PH UR STRIP.AUTO: 5.5 [PH] (ref 5–8)
PLATELET # BLD AUTO: 246 10*3/MM3 (ref 150–450)
PMV BLD AUTO: 10.2 FL (ref 6–12)
POTASSIUM BLD-SCNC: 4.2 MMOL/L (ref 3.5–5.5)
PROPOXYPH UR QL: NEGATIVE
PROT SERPL-MCNC: 7.6 G/DL (ref 5.7–8.2)
PROT UR QL STRIP: ABNORMAL
RBC # BLD AUTO: 5.34 10*6/MM3 (ref 4.2–5.76)
RBC # UR: NORMAL /HPF
REF LAB TEST METHOD: NORMAL
SODIUM BLD-SCNC: 139 MMOL/L (ref 132–146)
SP GR UR STRIP: 1.04 (ref 1–1.03)
SQUAMOUS #/AREA URNS HPF: NORMAL /HPF
TRICYCLICS UR QL SCN: NEGATIVE
TROPONIN I SERPL-MCNC: 0 NG/ML (ref 0–0.07)
UROBILINOGEN UR QL STRIP: ABNORMAL
WBC NRBC COR # BLD: 10.59 10*3/MM3 (ref 3.5–10.8)
WBC UR QL AUTO: NORMAL /HPF
WHOLE BLOOD HOLD SPECIMEN: NORMAL
WHOLE BLOOD HOLD SPECIMEN: NORMAL

## 2018-08-12 PROCEDURE — 85025 COMPLETE CBC W/AUTO DIFF WBC: CPT | Performed by: EMERGENCY MEDICINE

## 2018-08-12 PROCEDURE — 25010000002 PROMETHAZINE PER 50 MG: Performed by: NURSE PRACTITIONER

## 2018-08-12 PROCEDURE — 25010000002 DICYCLOMINE PER 20 MG: Performed by: EMERGENCY MEDICINE

## 2018-08-12 PROCEDURE — 96374 THER/PROPH/DIAG INJ IV PUSH: CPT

## 2018-08-12 PROCEDURE — 81001 URINALYSIS AUTO W/SCOPE: CPT | Performed by: EMERGENCY MEDICINE

## 2018-08-12 PROCEDURE — 96361 HYDRATE IV INFUSION ADD-ON: CPT

## 2018-08-12 PROCEDURE — 83880 ASSAY OF NATRIURETIC PEPTIDE: CPT | Performed by: EMERGENCY MEDICINE

## 2018-08-12 PROCEDURE — 71045 X-RAY EXAM CHEST 1 VIEW: CPT

## 2018-08-12 PROCEDURE — 96372 THER/PROPH/DIAG INJ SC/IM: CPT

## 2018-08-12 PROCEDURE — 74177 CT ABD & PELVIS W/CONTRAST: CPT

## 2018-08-12 PROCEDURE — 83690 ASSAY OF LIPASE: CPT | Performed by: EMERGENCY MEDICINE

## 2018-08-12 PROCEDURE — 25010000002 HYDROMORPHONE PER 4 MG: Performed by: EMERGENCY MEDICINE

## 2018-08-12 PROCEDURE — 99284 EMERGENCY DEPT VISIT MOD MDM: CPT

## 2018-08-12 PROCEDURE — 84484 ASSAY OF TROPONIN QUANT: CPT

## 2018-08-12 PROCEDURE — 25010000002 LORAZEPAM PER 2 MG: Performed by: EMERGENCY MEDICINE

## 2018-08-12 PROCEDURE — 96375 TX/PRO/DX INJ NEW DRUG ADDON: CPT

## 2018-08-12 PROCEDURE — 93005 ELECTROCARDIOGRAM TRACING: CPT | Performed by: EMERGENCY MEDICINE

## 2018-08-12 PROCEDURE — 25010000002 ONDANSETRON PER 1 MG: Performed by: EMERGENCY MEDICINE

## 2018-08-12 PROCEDURE — 25010000002 IOPAMIDOL 61 % SOLUTION: Performed by: EMERGENCY MEDICINE

## 2018-08-12 PROCEDURE — 80306 DRUG TEST PRSMV INSTRMNT: CPT | Performed by: NURSE PRACTITIONER

## 2018-08-12 PROCEDURE — 80053 COMPREHEN METABOLIC PANEL: CPT | Performed by: EMERGENCY MEDICINE

## 2018-08-12 RX ORDER — HYDROMORPHONE HYDROCHLORIDE 1 MG/ML
0.5 INJECTION, SOLUTION INTRAMUSCULAR; INTRAVENOUS; SUBCUTANEOUS ONCE
Status: COMPLETED | OUTPATIENT
Start: 2018-08-12 | End: 2018-08-12

## 2018-08-12 RX ORDER — PROMETHAZINE HYDROCHLORIDE 25 MG/1
25 TABLET ORAL EVERY 6 HOURS PRN
Qty: 12 TABLET | Refills: 0 | Status: SHIPPED | OUTPATIENT
Start: 2018-08-12

## 2018-08-12 RX ORDER — LORAZEPAM 2 MG/ML
1 INJECTION INTRAMUSCULAR ONCE
Status: COMPLETED | OUTPATIENT
Start: 2018-08-12 | End: 2018-08-12

## 2018-08-12 RX ORDER — PROMETHAZINE HYDROCHLORIDE 25 MG/ML
12.5 INJECTION, SOLUTION INTRAMUSCULAR; INTRAVENOUS ONCE
Status: COMPLETED | OUTPATIENT
Start: 2018-08-12 | End: 2018-08-12

## 2018-08-12 RX ORDER — ONDANSETRON 2 MG/ML
4 INJECTION INTRAMUSCULAR; INTRAVENOUS ONCE
Status: COMPLETED | OUTPATIENT
Start: 2018-08-12 | End: 2018-08-12

## 2018-08-12 RX ORDER — RANITIDINE 150 MG/1
150 TABLET ORAL 2 TIMES DAILY
Qty: 20 TABLET | Refills: 0 | Status: SHIPPED | OUTPATIENT
Start: 2018-08-12 | End: 2019-01-16

## 2018-08-12 RX ORDER — DICYCLOMINE HCL 20 MG
20 TABLET ORAL EVERY 6 HOURS PRN
Qty: 12 TABLET | Refills: 0 | Status: SHIPPED | OUTPATIENT
Start: 2018-08-12

## 2018-08-12 RX ORDER — DICYCLOMINE HYDROCHLORIDE 10 MG/ML
20 INJECTION INTRAMUSCULAR ONCE
Status: COMPLETED | OUTPATIENT
Start: 2018-08-12 | End: 2018-08-12

## 2018-08-12 RX ORDER — SODIUM CHLORIDE 0.9 % (FLUSH) 0.9 %
10 SYRINGE (ML) INJECTION AS NEEDED
Status: DISCONTINUED | OUTPATIENT
Start: 2018-08-12 | End: 2018-08-12 | Stop reason: HOSPADM

## 2018-08-12 RX ADMIN — SODIUM CHLORIDE 2000 ML: 9 INJECTION, SOLUTION INTRAVENOUS at 10:24

## 2018-08-12 RX ADMIN — DICYCLOMINE HYDROCHLORIDE 20 MG: 20 INJECTION, SOLUTION INTRAMUSCULAR at 14:23

## 2018-08-12 RX ADMIN — IOPAMIDOL 95 ML: 612 INJECTION, SOLUTION INTRAVENOUS at 11:54

## 2018-08-12 RX ADMIN — ONDANSETRON 4 MG: 2 INJECTION INTRAMUSCULAR; INTRAVENOUS at 12:41

## 2018-08-12 RX ADMIN — HYDROMORPHONE HYDROCHLORIDE 0.5 MG: 1 INJECTION, SOLUTION INTRAMUSCULAR; INTRAVENOUS; SUBCUTANEOUS at 12:42

## 2018-08-12 RX ADMIN — LORAZEPAM 1 MG: 2 INJECTION INTRAMUSCULAR; INTRAVENOUS at 14:21

## 2018-08-12 RX ADMIN — PROMETHAZINE HYDROCHLORIDE 12.5 MG: 25 INJECTION INTRAMUSCULAR; INTRAVENOUS at 10:26

## 2018-08-12 NOTE — ED PROVIDER NOTES
Subjective   Epigastric pain with NV for several days. Seen at ED in [place] last night.    No cp. No soa.    Hx of cyclic vomiting but reports doesn't smoke pot.         Abdominal Pain   Pain location:  Epigastric  Pain quality: aching, burning and cramping    Pain radiates to:  Does not radiate  Pain severity:  Moderate  Onset quality:  Sudden  Duration:  2 days  Timing:  Constant  Progression:  Waxing and waning  Chronicity:  Recurrent  Context: eating    Relieved by:  Nothing  Worsened by:  Eating and movement  Ineffective treatments:  None tried  Associated symptoms: nausea and vomiting    Associated symptoms: no chest pain, no chills, no constipation, no diarrhea, no dysuria, no fever, no hematuria and no shortness of breath        Review of Systems   Constitutional: Negative for chills and fever.   Respiratory: Negative for shortness of breath.    Cardiovascular: Negative for chest pain.   Gastrointestinal: Positive for abdominal pain, nausea and vomiting. Negative for anal bleeding, blood in stool, constipation and diarrhea.   Genitourinary: Negative for difficulty urinating, dysuria, flank pain, frequency, hematuria and urgency.   All other systems reviewed and are negative.      Past Medical History:   Diagnosis Date   • Cholelithiasis    • Coronary artery disease    • Diverticulosis    • Elevated homocysteine (CMS/HCC)    • Fractures    • Gall stones    • Heart attack 06/20/2016   • Heart attack    • Kidney stone    • Kidney stones    • MI, old    • Myocardial infarct 06/2016    stent x 2   • Obesity    • Shingles 2015       Allergies   Allergen Reactions   • Wasp Venom Swelling     Local swelling       Past Surgical History:   Procedure Laterality Date   • APPENDECTOMY  2007   • CARDIAC CATHETERIZATION     • CARDIAC CATHETERIZATION  06/2016    Xience Alpine stent in diagonal and LAD   • CARDIAC CATHETERIZATION N/A 4/19/2017    Procedure: Left Heart Cath;  Surgeon: Renny Wilson MD;   Location:  [facility];  Service:    • CARDIAC CATHETERIZATION N/A 4/20/2017    Procedure: Angioplasty-coronary;  Surgeon: Renny Wilson MD;  Location:  [facility];  Service:    • CARDIAC CATHETERIZATION N/A 5/15/2017    Procedure: Left Heart Cath;  Surgeon: Renny Wilson MD;  Location:  [facility];  Service:    • CHOLECYSTECTOMY  2005   • COLONOSCOPY  2014   • CORONARY STENT PLACEMENT  06/20/2016    2 stents   • FINGER FUSION Left     thumb   • HERNIA REPAIR  2013   • INTERVENTIONAL RADIOLOGY PROCEDURE N/A 4/20/2017    Procedure: Intravascular Ultrasound;  Surgeon: Renny Wilson MD;  Location:  [facility];  Service:    • KIDNEY STONE SURGERY      STONES REMOVED 2006, 2007, 2008       Family History   Problem Relation Age of Onset   • Arthritis Mother    • Heart attack Mother         30s   • Heart attack Father         30s   • Hypertension Father    • Heart attack Brother         30s   • Cancer Maternal Aunt    • Cancer Maternal Uncle    • Cancer Paternal Uncle    • Diabetes Mother    • Diabetes Father        Social History     Social History   • Marital status:      Social History Main Topics   • Smoking status: Current Every Day Smoker     Packs/day: 0.50     Years: 17.00     Types: Cigarettes     Last attempt to quit: 7/15/2016   • Alcohol use Yes      Comment: OCCASIONAL   • Drug use: No   • Sexual activity: Defer     Other Topics Concern   • Not on file     Social History Narrative    ** Merged History Encounter **                Objective   Physical Exam   Constitutional: He is oriented to person, place, and time. He appears well-developed and well-nourished.   HENT:   Head: Normocephalic and atraumatic.   Right Ear: External ear normal.   Left Ear: External ear normal.   Nose: Nose normal.   Mouth/Throat: Oropharynx is clear and moist.   Eyes: Pupils are equal, round, and reactive to light.  Conjunctivae and EOM are normal.   Neck: Normal range of motion. Neck supple.   Cardiovascular: Normal rate, regular rhythm, normal heart sounds and intact distal pulses.    Pulmonary/Chest: Effort normal and breath sounds normal.   Abdominal: Soft. Bowel sounds are normal. There is tenderness.   Musculoskeletal: Normal range of motion.   Neurological: He is alert and oriented to person, place, and time.   Skin: Skin is warm and dry.   Psychiatric: He has a normal mood and affect. His behavior is normal. Judgment normal.       Procedures           ED Course  ED Course as of Aug 12 1500   Sun Aug 12, 2018   1450 No vomiting for duration of eD stay. Reassuring labs and ct. GI fu.    Pt thankful and agreeable with tx poc. Well aware of the ss of worsening condition.    [CHANELL]   1455 No cp. No soa. Reassuring heart cath last year. Reproducible epigastric  pain. Neg trp and ekg today.  [CHANELL]      ED Course User Index  [JM] Altaf Victor APRN                CT Abdomen Pelvis With Contrast   Final Result   No acute finding in the abdomen or pelvis.       DICTATED:   8/12/2018   EDITED/ls :   8/12/2018        This report was finalized on 8/12/2018 2:55 PM by Nima Nelson.          XR Chest 1 View   Preliminary Result   No acute cardiopulmonary abnormality.       DICTATED:   8/12/2018   EDITED/ls :   8/12/2018                     MDM      Final diagnoses:   Epigastric pain   Nausea and vomiting, intractability of vomiting not specified, unspecified vomiting type            Altaf Victor APRN  08/12/18 1500

## 2018-08-12 NOTE — ED NOTES
WALKED INTO ROOM TO MEDICATE PT. PT ASLEEP IN BED, TURNED LIGHT ON AND PT SAT UP IN BED. PT WIFE HAD CHAIRS PLACED TOGETHER AND ASLEEP COVERED IN BLANKETS, PT SON SITTING PLAYING ON PHONE. PT GIVEN MEDICATION AND LAID BACK DOWN TO SLEEP.     Federica Peterson  08/12/18 6610

## 2018-08-13 ENCOUNTER — OFFICE VISIT (OUTPATIENT)
Dept: INTERNAL MEDICINE | Facility: CLINIC | Age: 38
End: 2018-08-13

## 2018-08-13 VITALS
SYSTOLIC BLOOD PRESSURE: 146 MMHG | DIASTOLIC BLOOD PRESSURE: 84 MMHG | HEIGHT: 71 IN | OXYGEN SATURATION: 96 % | WEIGHT: 311.75 LBS | HEART RATE: 92 BPM | BODY MASS INDEX: 43.65 KG/M2 | TEMPERATURE: 98.1 F | RESPIRATION RATE: 16 BRPM

## 2018-08-13 DIAGNOSIS — K21.9 GASTROESOPHAGEAL REFLUX DISEASE, ESOPHAGITIS PRESENCE NOT SPECIFIED: ICD-10-CM

## 2018-08-13 DIAGNOSIS — R10.13 ABDOMINAL PAIN, EPIGASTRIC: Primary | ICD-10-CM

## 2018-08-13 DIAGNOSIS — K44.9 HIATAL HERNIA: ICD-10-CM

## 2018-08-13 DIAGNOSIS — R11.2 NAUSEA AND VOMITING, INTRACTABILITY OF VOMITING NOT SPECIFIED, UNSPECIFIED VOMITING TYPE: ICD-10-CM

## 2018-08-13 PROCEDURE — 99214 OFFICE O/P EST MOD 30 MIN: CPT | Performed by: NURSE PRACTITIONER

## 2018-08-13 RX ORDER — SUCRALFATE 1 G/1
1 TABLET ORAL 4 TIMES DAILY
Qty: 28 TABLET | Refills: 1 | Status: SHIPPED | OUTPATIENT
Start: 2018-08-13 | End: 2018-08-20

## 2018-08-13 NOTE — PROGRESS NOTES
Chief Complaint / Reason:      Chief Complaint   Patient presents with   • Abdominal Pain     f/u-saw Dr. Holly last week, then went to the ER in [place] and then [facility] yesterday. Worse than when 1st seen for this.    • Vomiting       Subjective     HPI  Patient presents today with complaints of abdominal pain and vomiting.  He saw Dr. Holly on 8/8/18 with nausea and then was seen in the ER at [facility] and then [facility] yesterday with the same complaints.  States he has vomited 10 times in the last 24 hours.  He states that he has been dealing with abdominal issues since his 20s but symptoms have progressively worsened.  He denies blood in stool or emesis.  He states that he does have some streaking in his vomit from straining.  Denies fever or chills.  He states that he has had scopes in the past but they never revealed any abnormalities.  He does not have his gallbladder and he reports the pain is in the epigastric region.  He does have reflux and noted on a previous CT scan he has a small hiatal hernia.  He has been moving recently and states that he has been lifting heavy boxes and pulling and tugging.  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  Denies chest pain, shortness of breath or heart palpitations.  He has not seen gastroenterology as he was previously referred to Dr. Buckner and states that they have been playing phone tag and he gave up his decreased appetite and states that the epigastric pain radiates toward his back.  They did do a UA , labs which included  Troponins BNP, CXR and CT scan.  He did have elevated neutrophils and decreased lymphocytes.  History taken from: patient    PMH/FH/Social History were reviewed and updated appropriately in the electronic medical record.     Review of Systems:   Review of Systems   Constitutional: Positive for activity change, appetite change, fatigue and unexpected weight change.   Respiratory: Negative.    Cardiovascular: Negative.     Gastrointestinal: Positive for abdominal pain, nausea and vomiting. Negative for blood in stool.   Skin: Negative.    Neurological: Positive for weakness.   Psychiatric/Behavioral: Positive for dysphoric mood. The patient is nervous/anxious.         Stress     All other systems were reviewed and are negative.  Exceptions are noted in the subjective or above.      Objective     Vital Signs  Vitals:    08/13/18 1154   BP: 146/84   Pulse: 92   Resp: 16   Temp: 98.1 °F (36.7 °C)   SpO2: 96%       Body mass index is 43.48 kg/m².    Physical Exam   Constitutional: He is oriented to person, place, and time. Vital signs are normal. He appears well-developed and well-nourished. He is active. He does not appear ill.   HENT:   Head: Normocephalic and atraumatic.   Right Ear: Hearing normal.   Left Ear: Hearing normal.   Nose: Nose normal.   Eyes: Pupils are equal, round, and reactive to light. EOM and lids are normal. No scleral icterus.   Cardiovascular: Normal rate, regular rhythm and normal heart sounds.    Pulmonary/Chest: Effort normal and breath sounds normal.   Abdominal: Soft. Bowel sounds are normal. There is tenderness in the epigastric area. There is no rigidity, no rebound and no guarding.   Neurological: He is alert and oriented to person, place, and time. No cranial nerve deficit. Gait normal.   Skin: Skin is warm. He is not diaphoretic. No cyanosis. Nails show no clubbing.   Psychiatric: His speech is normal and behavior is normal. Judgment and thought content normal. His mood appears anxious. Cognition and memory are normal.   Nursing note and vitals reviewed.       Results Review:    I reviewed the patient's previous clinical results.   Results for orders placed during the hospital encounter of 08/12/18   CT Abdomen Pelvis With Contrast    Narrative EXAMINATION: CT ABDOMEN PELVIS W/CONTRAST - 8/12/2018      INDICATION: Abdominal pain, nausea and vomiting.      TECHNIQUE:  Axial CT data of the abdomen and  pelvis were acquired  helically following IV contrast administration. Multiplanar reformatted  images were generated and reviewed. The radiation dose reduction device  was turned on for each scan per the ALARA (As Low as Reasonably  Achievable) protocol     COMPARISONS:  4/22/2017.     FINDINGS:  Status post cholecystectomy and appendectomy. The liver,  spleen, kidneys, adrenal glands and pancreas are unremarkable. No  dilated small or large bowel. Clear lung bases. Unremarkable body wall  soft tissues. Unremarkable bones.       Impression No acute finding in the abdomen or pelvis.     DICTATED:   8/12/2018  EDITED/ls :   8/12/2018      This report was finalized on 8/12/2018 2:55 PM by Nima Nelson.        Results for orders placed during the hospital encounter of 08/12/18   XR Chest 1 View    Narrative EXAMINATION: XR CHEST 1 VW - 8/12/2018     INDICATION: Upper abdominal pain.     TECHNIQUE:  Single view frontal chest.     COMPARISONS: 11/6/2017.     FINDINGS:  Lungs are without focal abnormality. No pleural effusion or  pneumothorax. Cardiomediastinal silhouette is within normal limits.       Impression No acute cardiopulmonary abnormality.     DICTATED:   8/12/2018  EDITED/ls :   8/12/2018      This report was finalized on 8/12/2018 2:58 PM by Nima Nelson.          No visits with results within 1 Day(s) from this visit.   Latest known visit with results is:   Admission on 08/12/2018, Discharged on 08/12/2018   Component Date Value Ref Range Status   • Glucose 08/12/2018 127* 70 - 100 mg/dL Final   • BUN 08/12/2018 17  9 - 23 mg/dL Final   • Creatinine 08/12/2018 0.78  0.60 - 1.30 mg/dL Final   • Sodium 08/12/2018 139  132 - 146 mmol/L Final   • Potassium 08/12/2018 4.2  3.5 - 5.5 mmol/L Final   • Chloride 08/12/2018 109  99 - 109 mmol/L Final   • CO2 08/12/2018 26.0  20.0 - 31.0 mmol/L Final   • Calcium 08/12/2018 9.4  8.7 - 10.4 mg/dL Final   • Total Protein 08/12/2018 7.6  5.7 - 8.2 g/dL Final   • Albumin  08/12/2018 4.51  3.20 - 4.80 g/dL Final   • ALT (SGPT) 08/12/2018 24  7 - 40 U/L Final   • AST (SGOT) 08/12/2018 21  0 - 33 U/L Final   • Alkaline Phosphatase 08/12/2018 54  25 - 100 U/L Final   • Total Bilirubin 08/12/2018 0.6  0.3 - 1.2 mg/dL Final   • eGFR Non African Amer 08/12/2018 113  >60 mL/min/1.73 Final   • Globulin 08/12/2018 3.1  gm/dL Final   • A/G Ratio 08/12/2018 1.5  1.5 - 2.5 g/dL Final   • BUN/Creatinine Ratio 08/12/2018 21.8  7.0 - 25.0 Final   • Anion Gap 08/12/2018 4.0  3.0 - 11.0 mmol/L Final   • Lipase 08/12/2018 30  6 - 51 U/L Final   • Color, UA 08/12/2018 Dark Yellow* Yellow, Straw Final   • Appearance, UA 08/12/2018 Turbid* Clear Final   • pH, UA 08/12/2018 5.5  5.0 - 8.0 Final   • Specific Gravity, UA 08/12/2018 1.044* 1.001 - 1.030 Final   • Glucose, UA 08/12/2018 Negative  Negative Final   • Ketones, UA 08/12/2018 Trace* Negative Final   • Bilirubin, UA 08/12/2018 Small (1+)* Negative Final   • Blood, UA 08/12/2018 Negative  Negative Final   • Protein, UA 08/12/2018 Trace* Negative Final   • Leuk Esterase, UA 08/12/2018 Negative  Negative Final   • Nitrite, UA 08/12/2018 Negative  Negative Final   • Urobilinogen, UA 08/12/2018 1.0 E.U./dL  0.2 - 1.0 E.U./dL Final   • Extra Tube 08/12/2018 hold for add-on   Final    Auto resulted   • Extra Tube 08/12/2018 Hold for add-ons.   Final    Auto resulted.   • Extra Tube 08/12/2018 hold for add-on   Final    Auto resulted   • Extra Tube 08/12/2018 Hold for add-ons.   Final    Auto resulted.   • WBC 08/12/2018 10.59  3.50 - 10.80 10*3/mm3 Final   • RBC 08/12/2018 5.34  4.20 - 5.76 10*6/mm3 Final   • Hemoglobin 08/12/2018 16.1  13.1 - 17.5 g/dL Final   • Hematocrit 08/12/2018 47.5  38.9 - 50.9 % Final   • MCV 08/12/2018 89.0  80.0 - 99.0 fL Final   • MCH 08/12/2018 30.1  27.0 - 31.0 pg Final   • MCHC 08/12/2018 33.9  32.0 - 36.0 g/dL Final   • RDW 08/12/2018 12.9  11.3 - 14.5 % Final   • RDW-SD 08/12/2018 41.8  37.0 - 54.0 fl Final   • MPV  08/12/2018 10.2  6.0 - 12.0 fL Final   • Platelets 08/12/2018 246  150 - 450 10*3/mm3 Final   • Neutrophil % 08/12/2018 71.3* 41.0 - 71.0 % Final   • Lymphocyte % 08/12/2018 19.6* 24.0 - 44.0 % Final   • Monocyte % 08/12/2018 8.0  0.0 - 12.0 % Final   • Eosinophil % 08/12/2018 0.7  0.0 - 3.0 % Final   • Basophil % 08/12/2018 0.4  0.0 - 1.0 % Final   • Immature Grans % 08/12/2018 0.2  0.0 - 0.6 % Final   • Neutrophils, Absolute 08/12/2018 7.55  1.50 - 8.30 10*3/mm3 Final   • Lymphocytes, Absolute 08/12/2018 2.08  0.60 - 4.80 10*3/mm3 Final   • Monocytes, Absolute 08/12/2018 0.85  0.00 - 1.00 10*3/mm3 Final   • Eosinophils, Absolute 08/12/2018 0.07  0.00 - 0.30 10*3/mm3 Final   • Basophils, Absolute 08/12/2018 0.04  0.00 - 0.20 10*3/mm3 Final   • Immature Grans, Absolute 08/12/2018 0.02  0.00 - 0.03 10*3/mm3 Final   • Troponin I 08/12/2018 0.00  0.00 - 0.07 ng/mL Final    Serial Number: [id number]:  [id number]   • THC, Screen, Urine 08/12/2018 Negative  Negative Final   • Phencyclidine (PCP), Urine 08/12/2018 Negative  Negative Final   • Cocaine Screen, Urine 08/12/2018 Negative  Negative Final   • Methamphetamine, Urine 08/12/2018 Negative  Negative Final   • Opiate Screen 08/12/2018 Negative  Negative Final   • Amphetamine Screen, Urine 08/12/2018 Negative  Negative Final   • Benzodiazepine Screen, Urine 08/12/2018 Negative  Negative Final   • Tricyclic Antidepressants Screen 08/12/2018 Negative  Negative Final   • Methadone Screen, Urine 08/12/2018 Negative  Negative Final   • Barbiturates Screen, Urine 08/12/2018 Negative  Negative Final   • Oxycodone Screen, Urine 08/12/2018 Negative  Negative Final   • Propoxyphene Screen 08/12/2018 Negative  Negative Final   • Buprenorphine, Screen, Urine 08/12/2018 Negative  Negative Final   • BNP 08/12/2018 53.0  0.0 - 100.0 pg/mL Final    Results may be falsely decreased if patient taking Biotin.   • RBC, UA 08/12/2018 0-2  None Seen, 0-2 /HPF Final   • WBC, UA 08/12/2018  0-2  None Seen, 0-2 /HPF Final   • Bacteria,  08/12/2018 None Seen  None Seen, Trace /HPF Final   • Squamous Epithelial Cells,  08/12/2018 0-2  None Seen, 0-2 /HPF Final   • Hyaline Casts,  08/12/2018 0-6  0 - 6 /LPF Final   • Methodology 08/12/2018 Automated Microscopy   Final             Medication Review:     Current Outpatient Prescriptions:   •  amitriptyline (ELAVIL) 10 MG tablet, Take 2 tablets by mouth Daily., Disp: 180 tablet, Rfl: 3  •  atorvastatin (LIPITOR) 40 MG tablet, Take 1 tablet by mouth Every Night., Disp: 90 tablet, Rfl: 3  •  clonazePAM (KlonoPIN) 0.5 MG tablet, Take 1 tablet by mouth 2 (Two) Times a Day As Needed for Anxiety., Disp: 30 tablet, Rfl: 1  •  dicyclomine (BENTYL) 20 MG tablet, Take 1 tablet by mouth Every 6 (Six) Hours As Needed (abdominal cramping)., Disp: 12 tablet, Rfl: 0  •  furosemide (LASIX) 20 MG tablet, 1-2 tablets as needed for swelling., Disp: 180 tablet, Rfl: 3  •  gabapentin (NEURONTIN) 300 MG capsule, Take 1 capsule by mouth 3 (Three) Times a Day., Disp: 270 capsule, Rfl: 1  •  lisinopril (PRINIVIL,ZESTRIL) 2.5 MG tablet, Take 1 tablet by mouth Daily., Disp: 90 tablet, Rfl: 3  •  metoclopramide (REGLAN) 5 MG tablet, Take 1 tablet by mouth 3 (Three) Times a Day., Disp: 270 tablet, Rfl: 3  •  metoprolol succinate XL (TOPROL-XL) 25 MG 24 hr tablet, Take 1 tablet by mouth Daily., Disp: 90 tablet, Rfl: 3  •  mometasone (ELOCON) 0.1 % cream, Apply  topically Daily., Disp: 45 g, Rfl: 0  •  nitroglycerin (NITROSTAT) 0.4 MG SL tablet, Place 1 tablet under the tongue Every 5 (Five) Minutes As Needed for Chest Pain. Take no more than 3 doses in 15 minutes., Disp: 100 tablet, Rfl: 12  •  ondansetron ODT (ZOFRAN-ODT) 4 MG disintegrating tablet, Take 1 tablet by mouth Every 6 (Six) Hours As Needed for Nausea or Vomiting., Disp: 30 tablet, Rfl: 1  •  pantoprazole (PROTONIX) 40 MG EC tablet, Take 1 tablet by mouth Daily., Disp: 90 tablet, Rfl: 3  •  potassium chloride  (K-DUR,KLOR-CON) 20 MEQ CR tablet, Take 1 tablet by mouth Daily., Disp: 90 tablet, Rfl: 3  •  prasugrel (EFFIENT) 10 MG tablet, Take 1 tablet by mouth Daily for 360 days., Disp: 90 tablet, Rfl: 1  •  promethazine (PHENERGAN) 25 MG tablet, Take 1 tablet by mouth Every 6 (Six) Hours As Needed for Nausea or Vomiting., Disp: 60 tablet, Rfl: 2  •  promethazine (PHENERGAN) 25 MG tablet, Take 1 tablet by mouth Every 6 (Six) Hours As Needed for Nausea or Vomiting., Disp: 12 tablet, Rfl: 0  •  raNITIdine (ZANTAC) 150 MG tablet, Take 1 tablet by mouth 2 (Two) Times a Day., Disp: 20 tablet, Rfl: 0  •  venlafaxine XR (EFFEXOR-XR) 75 MG 24 hr capsule, Take 1 capsule by mouth Daily., Disp: 90 capsule, Rfl: 3  •  sucralfate (CARAFATE) 1 g tablet, Take 1 tablet by mouth 4 (Four) Times a Day for 7 days., Disp: 28 tablet, Rfl: 1  No current facility-administered medications for this visit.     Assessment/Plan   Kenneth was seen today for abdominal pain and vomiting.    Diagnoses and all orders for this visit:    Abdominal pain, epigastric  -     sucralfate (CARAFATE) 1 g tablet; Take 1 tablet by mouth 4 (Four) Times a Day for 7 days.    Hiatal hernia   advised patient to avoid heavy lifting and straining until after seen GI   Gastroesophageal reflux disease, esophagitis presence not specified  Discussed dietary modifications and weight loss   Nausea and vomiting, intractability of vomiting not specified, unspecified vomiting type  Advised patient to use Phenergan and Zofran and will see what is needed for GI referral to be expedited.    Obtain records from outlying facility for review  Return if symptoms worsen or fail to improve.    Neela Wagner, APRN  08/13/2018

## 2018-08-22 ENCOUNTER — OFFICE VISIT (OUTPATIENT)
Dept: INTERNAL MEDICINE | Facility: CLINIC | Age: 38
End: 2018-08-22

## 2018-08-22 VITALS
HEART RATE: 100 BPM | BODY MASS INDEX: 42.98 KG/M2 | SYSTOLIC BLOOD PRESSURE: 142 MMHG | DIASTOLIC BLOOD PRESSURE: 92 MMHG | OXYGEN SATURATION: 98 % | HEIGHT: 71 IN | TEMPERATURE: 98.2 F | WEIGHT: 307 LBS

## 2018-08-22 DIAGNOSIS — R11.15 INTRACTABLE CYCLICAL VOMITING WITH NAUSEA: Primary | ICD-10-CM

## 2018-08-22 PROCEDURE — 99213 OFFICE O/P EST LOW 20 MIN: CPT | Performed by: PHYSICIAN ASSISTANT

## 2018-08-22 RX ORDER — SCOLOPAMINE TRANSDERMAL SYSTEM 1 MG/1
1 PATCH, EXTENDED RELEASE TRANSDERMAL
Qty: 10 EACH | Refills: 1 | Status: SHIPPED | OUTPATIENT
Start: 2018-08-22 | End: 2019-04-02

## 2018-08-22 NOTE — PROGRESS NOTES
Subjective     Chief Complaint: nausea, vomiting    History of Present Illness     Kenneth Aguilar is a 37 y.o. male presenting with complaints of several days nausea and vomiting.  Patient has been dealing with this off and on for many years.  He was scheduled with GI yesterday menaces appointment.  Has been rescheduled for 9/6/18.  Current treatments include Bentyl, Reglan, Zofran, Phenergan, Protonix, and Zantac.  Not eating or drinking much due to his symptoms.  He was recently seen in the ED, labs fairly unremarkable.  Denies any specific abdominal pain, constipation, blood in stool.    The following portions of the patient's history were reviewed and updated as appropriate: current medications, allergies, PMH.    Review of Systems   Constitutional: Negative for appetite change, chills, fatigue, fever and unexpected weight change.   HENT: Negative for congestion, ear pain, hearing loss, nosebleeds, sinus pressure, sore throat, tinnitus and trouble swallowing.    Eyes: Negative for photophobia, discharge and visual disturbance.   Respiratory: Negative for cough, chest tightness, shortness of breath and wheezing.    Cardiovascular: Negative for chest pain, palpitations and leg swelling.   Gastrointestinal: Positive for nausea and vomiting. Negative for abdominal distention, abdominal pain, blood in stool, constipation and diarrhea.   Endocrine: Negative for cold intolerance, heat intolerance, polydipsia, polyphagia and polyuria.   Genitourinary: Negative for difficulty urinating, dysuria, flank pain, frequency, hematuria and urgency.   Musculoskeletal: Negative for arthralgias, back pain, joint swelling, myalgias, neck pain and neck stiffness.   Skin: Negative for color change, pallor, rash and wound.   Allergic/Immunologic: Negative for environmental allergies, food allergies and immunocompromised state.   Neurological: Positive for dizziness and weakness. Negative for numbness and headaches.   Hematological:  "Negative for adenopathy. Does not bruise/bleed easily.   Psychiatric/Behavioral: Negative for dysphoric mood, hallucinations, sleep disturbance and suicidal ideas. The patient is not nervous/anxious.      Objective     Vitals:    08/22/18 1449   BP: 142/92   Pulse: 100   Temp: 98.2 °F (36.8 °C)   SpO2: 98%   Weight: (!) 139 kg (307 lb)   Height: 180.3 cm (70.98\")       Physical Exam   Constitutional: Ill appearing.  Cardiovascular: Normal rate, regular rhythm.  Pulmonary/Chest: Effort normal and breath sounds normal.   Abdominal: Some epigastric tenderness noted.  Soft. Bowel sounds are normal.   Lymphadenopathy: No cervical adenopathy noted.   Neurological: Alert and oriented to person, place, and time.   Skin: Skin is warm and dry.   Psychiatric: Exhibits a normal mood and affect.     Assessment/Plan     Diagnoses and all orders for this visit:    Intractable cyclical vomiting with nausea  -     Scopolamine (TRANSDERM-SCOP, 1.5 MG,) 1.5 MG/3DAYS patch; Place 1 patch on the skin as directed by provider Every 72 (Seventy-Two) Hours.      Will try adding on scopolamine patches.  Keep GI appointment for further evaluation.  Encouraged importance of staying well hydrated.    Evelin Hernandez PA-C  08/22/2018         Please note that portions of this note were completed with a voice recognition program. Efforts were made to edit dictation, but occasionally words are mistranscribed.  "

## 2018-08-27 ENCOUNTER — TELEPHONE (OUTPATIENT)
Dept: INTERNAL MEDICINE | Facility: CLINIC | Age: 38
End: 2018-08-27

## 2018-08-27 NOTE — TELEPHONE ENCOUNTER
Patient called and states he was told to call back if he was not any better by Monday and he could get a doctors note. He would like to speak with nurse.

## 2018-08-27 NOTE — TELEPHONE ENCOUNTER
Note is fine x 3 days. He is having a lot of issues with nausea and vomiting. Please remind him to keep his appointment with GI so that we may get down to the bottom of this.

## 2018-08-28 ENCOUNTER — TELEPHONE (OUTPATIENT)
Dept: INTERNAL MEDICINE | Facility: CLINIC | Age: 38
End: 2018-08-28

## 2018-08-28 NOTE — TELEPHONE ENCOUNTER
Patient would like to know if a return to work statement can be faxed to his wife's work (she works at a doctor's office)? Fax # [phone]. He would like to return to work tomorrow.    Patient was seen last week.

## 2019-01-15 ENCOUNTER — TELEPHONE (OUTPATIENT)
Dept: INTERNAL MEDICINE | Facility: CLINIC | Age: 39
End: 2019-01-15

## 2019-01-16 ENCOUNTER — HOSPITAL ENCOUNTER (OUTPATIENT)
Dept: ULTRASOUND IMAGING | Facility: HOSPITAL | Age: 39
Discharge: HOME | End: 2019-01-16
Admitting: PHYSICIAN ASSISTANT

## 2019-01-16 ENCOUNTER — OFFICE VISIT (OUTPATIENT)
Dept: INTERNAL MEDICINE | Facility: CLINIC | Age: 39
End: 2019-01-16

## 2019-01-16 ENCOUNTER — HOSPITAL ENCOUNTER (OUTPATIENT)
Dept: CT IMAGING | Facility: HOSPITAL | Age: 39
Discharge: HOME | End: 2019-01-16

## 2019-01-16 VITALS
BODY MASS INDEX: 43.65 KG/M2 | OXYGEN SATURATION: 97 % | RESPIRATION RATE: 18 BRPM | HEART RATE: 95 BPM | SYSTOLIC BLOOD PRESSURE: 142 MMHG | DIASTOLIC BLOOD PRESSURE: 88 MMHG | TEMPERATURE: 99.3 F | WEIGHT: 311.8 LBS | HEIGHT: 71 IN

## 2019-01-16 DIAGNOSIS — R10.32 INGUINAL PAIN, LEFT: ICD-10-CM

## 2019-01-16 DIAGNOSIS — R10.32 GROIN PAIN, LEFT: Primary | ICD-10-CM

## 2019-01-16 LAB
BILIRUB BLD-MCNC: ABNORMAL MG/DL
CLARITY, POC: CLEAR
COLOR UR: YELLOW
GLUCOSE UR STRIP-MCNC: NEGATIVE MG/DL
KETONES UR QL: NEGATIVE
LEUKOCYTE EST, POC: NEGATIVE
NITRITE UR-MCNC: NEGATIVE MG/ML
PH UR: 5 [PH] (ref 5–8)
PROT UR STRIP-MCNC: NEGATIVE MG/DL
RBC # UR STRIP: NEGATIVE /UL
SP GR UR: 1.02 (ref 1–1.03)
UROBILINOGEN UR QL: ABNORMAL

## 2019-01-16 PROCEDURE — 76857 US EXAM PELVIC LIMITED: CPT

## 2019-01-16 PROCEDURE — 74177 CT ABD & PELVIS W/CONTRAST: CPT

## 2019-01-16 PROCEDURE — 99213 OFFICE O/P EST LOW 20 MIN: CPT | Performed by: PHYSICIAN ASSISTANT

## 2019-01-16 PROCEDURE — 81003 URINALYSIS AUTO W/O SCOPE: CPT | Performed by: PHYSICIAN ASSISTANT

## 2019-01-16 PROCEDURE — 25010000002 IOPAMIDOL 61 % SOLUTION: Performed by: PHYSICIAN ASSISTANT

## 2019-01-16 RX ADMIN — BARIUM SULFATE 450 ML: 21 SUSPENSION ORAL at 13:14

## 2019-01-16 RX ADMIN — IOPAMIDOL 100 ML: 612 INJECTION, SOLUTION INTRAVENOUS at 13:14

## 2019-01-25 ENCOUNTER — OFFICE VISIT (OUTPATIENT)
Dept: INTERNAL MEDICINE | Facility: CLINIC | Age: 39
End: 2019-01-25

## 2019-01-25 VITALS
OXYGEN SATURATION: 98 % | DIASTOLIC BLOOD PRESSURE: 81 MMHG | SYSTOLIC BLOOD PRESSURE: 135 MMHG | WEIGHT: 306 LBS | BODY MASS INDEX: 42.84 KG/M2 | HEIGHT: 71 IN | TEMPERATURE: 95 F

## 2019-01-25 DIAGNOSIS — K40.90 RIGHT INGUINAL HERNIA: Primary | ICD-10-CM

## 2019-01-25 PROCEDURE — 99213 OFFICE O/P EST LOW 20 MIN: CPT | Performed by: PHYSICIAN ASSISTANT

## 2019-01-25 PROCEDURE — 96372 THER/PROPH/DIAG INJ SC/IM: CPT | Performed by: PHYSICIAN ASSISTANT

## 2019-01-25 RX ORDER — KETOROLAC TROMETHAMINE 30 MG/ML
60 INJECTION, SOLUTION INTRAMUSCULAR; INTRAVENOUS ONCE
Status: COMPLETED | OUTPATIENT
Start: 2019-01-25 | End: 2019-01-25

## 2019-01-25 RX ADMIN — KETOROLAC TROMETHAMINE 60 MG: 30 INJECTION, SOLUTION INTRAMUSCULAR; INTRAVENOUS at 11:29

## 2019-01-25 NOTE — PROGRESS NOTES
Subjective     Chief Complaint: pain to groin    History of Present Illness     Kenneth Aguilar is a 38 y.o. male presenting with complaints of pain to lower abdomen and groin bilaterally.  Pain is sharp and radiates down into testicles.  Patient has been diagnosed with a right inguinal hernia and is scheduled for repair with Dr. Acuña in [place] on 2/14/19.  He has been using Tylenol and ibuprofen without much relief, concerned with his pain as his surgery is several weeks away.  He denies any urinary symptoms, testicular swelling, penile discharge, genital lesions, fever, nausea, vomiting, constipation or diarrhea.  Reports regular bowel movements.  States he has had an inguinal hernia repaired in the past, believes it was on his left side.     The following portions of the patient's history were reviewed and updated as appropriate: current medications, allergies, PMH.    Review of Systems   Constitutional: Negative for appetite change, chills, fatigue, fever and unexpected weight change.   HENT: Negative for congestion, ear pain, hearing loss, nosebleeds, sinus pressure, sore throat, tinnitus and trouble swallowing.    Eyes: Negative for photophobia, discharge and visual disturbance.   Respiratory: Negative for cough, chest tightness, shortness of breath and wheezing.    Cardiovascular: Negative for chest pain, palpitations and leg swelling.   Gastrointestinal: Positive for abdominal pain. Negative for abdominal distention, blood in stool, constipation, diarrhea, nausea and vomiting.   Endocrine: Negative for cold intolerance, heat intolerance, polydipsia, polyphagia and polyuria.   Genitourinary: Positive for testicular pain. Negative for difficulty urinating, dysuria, flank pain, frequency, hematuria and urgency.   Musculoskeletal: Negative for arthralgias, back pain, joint swelling, myalgias, neck pain and neck stiffness.   Skin: Negative for color change, pallor, rash and wound.   Allergic/Immunologic:  "Negative for environmental allergies, food allergies and immunocompromised state.   Neurological: Negative for dizziness, weakness, numbness and headaches.   Hematological: Negative for adenopathy. Does not bruise/bleed easily.   Psychiatric/Behavioral: Negative for dysphoric mood, hallucinations, sleep disturbance and suicidal ideas. The patient is not nervous/anxious.        Objective     Vitals:    01/25/19 0949   BP: 135/81   Temp: 95 °F (35 °C)   SpO2: 98%   Weight: (!) 139 kg (306 lb)   Height: 180.3 cm (70.98\")     Physical Exam   Constitutional: He is oriented to person, place, and time. He appears well-developed and well-nourished.   HENT:   Right Ear: Tympanic membrane normal.   Left Ear: Tympanic membrane normal.   Mouth/Throat: Oropharynx is clear and moist.   Neck: Normal range of motion. Neck supple.   Cardiovascular: Normal rate and regular rhythm.   Pulmonary/Chest: Effort normal and breath sounds normal.   Abdominal: Soft. Bowel sounds are normal. There is tenderness in the right lower quadrant, suprapubic area and left lower quadrant. There is no CVA tenderness. A hernia is present. Hernia confirmed positive in the right inguinal area (not palpable, noted on CT).   Exam limited by obesity.   Musculoskeletal: Normal range of motion.   Lymphadenopathy:     He has no cervical adenopathy.   Neurological: He is alert and oriented to person, place, and time.   Skin: Skin is warm and dry.   Psychiatric: He has a normal mood and affect.       Assessment/Plan     Diagnoses and all orders for this visit:    Right inguinal hernia  -     ketorolac (TORADOL) injection 60 mg; Inject 2 mL into the appropriate muscle as directed by prescriber 1 (One) Time.  -     diclofenac (VOLTAREN) 50 MG EC tablet; Take 1 tablet by mouth 2 (Two) Times a Day As Needed (pain).    Continue to alternate with tylenol.  Keep scheduled preop with general surgery.    Evelin Hernandez PA-C  01/25/2019         Please note that portions of " this note were completed with a voice recognition program. Efforts were made to edit dictation, but occasionally words are mistranscribed.

## 2019-01-28 ENCOUNTER — TELEPHONE (OUTPATIENT)
Dept: INTERNAL MEDICINE | Facility: CLINIC | Age: 39
End: 2019-01-28

## 2019-01-28 NOTE — TELEPHONE ENCOUNTER
Pt called stating that he was advised to call today if not feeling better, pt stated that he is still having groin pain

## 2019-01-29 NOTE — TELEPHONE ENCOUNTER
I saw patient for groin pain- he has an inguinal hernia and surgical repair scheduled for 2/14/19. He was complaining of pain so I gave him diclofenac. Still using tylenol. He called back again yesterday stating it isn't helping. Do you believe anything stronger would be warranted? I've already told him to contact his surgeon's office as well.

## 2019-01-29 NOTE — TELEPHONE ENCOUNTER
Please let patient know the diclofenac I've given him is the strongest we can due for his pain at this time. Continue with extra strength tylenol as well.

## 2019-04-02 ENCOUNTER — APPOINTMENT (OUTPATIENT)
Dept: CT IMAGING | Facility: HOSPITAL | Age: 39
End: 2019-04-02

## 2019-04-02 ENCOUNTER — HOSPITAL ENCOUNTER (EMERGENCY)
Facility: HOSPITAL | Age: 39
Discharge: HOME | End: 2019-04-02
Attending: EMERGENCY MEDICINE | Admitting: EMERGENCY MEDICINE

## 2019-04-02 ENCOUNTER — APPOINTMENT (OUTPATIENT)
Dept: ULTRASOUND IMAGING | Facility: HOSPITAL | Age: 39
End: 2019-04-02

## 2019-04-02 VITALS
WEIGHT: 300 LBS | RESPIRATION RATE: 20 BRPM | TEMPERATURE: 98.2 F | BODY MASS INDEX: 42 KG/M2 | HEART RATE: 83 BPM | SYSTOLIC BLOOD PRESSURE: 126 MMHG | OXYGEN SATURATION: 98 % | HEIGHT: 71 IN | DIASTOLIC BLOOD PRESSURE: 85 MMHG

## 2019-04-02 DIAGNOSIS — R11.2 NON-INTRACTABLE VOMITING WITH NAUSEA, UNSPECIFIED VOMITING TYPE: ICD-10-CM

## 2019-04-02 DIAGNOSIS — R52 INTRACTABLE PAIN: Primary | ICD-10-CM

## 2019-04-02 DIAGNOSIS — R10.31 RIGHT GROIN PAIN: ICD-10-CM

## 2019-04-02 PROBLEM — N45.1 EPIDIDYMITIS: Status: ACTIVE | Noted: 2019-04-02

## 2019-04-02 PROBLEM — Z87.19 HISTORY OF INGUINAL HERNIA: Status: ACTIVE | Noted: 2019-04-02

## 2019-04-02 PROBLEM — D72.829 LEUKOCYTOSIS: Status: ACTIVE | Noted: 2019-04-02

## 2019-04-02 LAB
ALBUMIN SERPL-MCNC: 4.73 G/DL (ref 3.2–4.8)
ALBUMIN/GLOB SERPL: 1.7 G/DL (ref 1.5–2.5)
ALP SERPL-CCNC: 78 U/L (ref 25–100)
ALT SERPL W P-5'-P-CCNC: 18 U/L (ref 7–40)
ANION GAP SERPL CALCULATED.3IONS-SCNC: 9 MMOL/L (ref 3–11)
AST SERPL-CCNC: 18 U/L (ref 0–33)
BACTERIA UR QL AUTO: NORMAL /HPF
BASOPHILS # BLD AUTO: 0.02 10*3/MM3 (ref 0–0.2)
BASOPHILS NFR BLD AUTO: 0.1 % (ref 0–1)
BILIRUB SERPL-MCNC: 0.5 MG/DL (ref 0.3–1.2)
BILIRUB UR QL STRIP: ABNORMAL
BUN BLD-MCNC: 19 MG/DL (ref 9–23)
BUN/CREAT SERPL: 25 (ref 7–25)
CALCIUM SPEC-SCNC: 10.3 MG/DL (ref 8.7–10.4)
CHLORIDE SERPL-SCNC: 103 MMOL/L (ref 99–109)
CLARITY UR: CLEAR
CO2 SERPL-SCNC: 23 MMOL/L (ref 20–31)
COLOR UR: ABNORMAL
CREAT BLD-MCNC: 0.76 MG/DL (ref 0.6–1.3)
D-LACTATE SERPL-SCNC: 1.4 MMOL/L (ref 0.5–2)
DEPRECATED RDW RBC AUTO: 46.1 FL (ref 37–54)
EOSINOPHIL # BLD AUTO: 0.02 10*3/MM3 (ref 0–0.3)
EOSINOPHIL NFR BLD AUTO: 0.1 % (ref 0–3)
ERYTHROCYTE [DISTWIDTH] IN BLOOD BY AUTOMATED COUNT: 14.1 % (ref 11.3–14.5)
ERYTHROCYTE [SEDIMENTATION RATE] IN BLOOD: 64 MM/HR (ref 0–15)
GFR SERPL CREATININE-BSD FRML MDRD: 115 ML/MIN/1.73
GLOBULIN UR ELPH-MCNC: 2.8 GM/DL
GLUCOSE BLD-MCNC: 124 MG/DL (ref 70–100)
GLUCOSE UR STRIP-MCNC: NEGATIVE MG/DL
HCT VFR BLD AUTO: 42.1 % (ref 38.9–50.9)
HGB BLD-MCNC: 14.5 G/DL (ref 13.1–17.5)
HGB UR QL STRIP.AUTO: NEGATIVE
HYALINE CASTS UR QL AUTO: NORMAL /LPF
IMM GRANULOCYTES # BLD AUTO: 0.03 10*3/MM3 (ref 0–0.05)
IMM GRANULOCYTES NFR BLD AUTO: 0.2 % (ref 0–0.6)
KETONES UR QL STRIP: ABNORMAL
LEUKOCYTE ESTERASE UR QL STRIP.AUTO: ABNORMAL
LIPASE SERPL-CCNC: 28 U/L (ref 6–51)
LYMPHOCYTES # BLD AUTO: 1.44 10*3/MM3 (ref 0.6–4.8)
LYMPHOCYTES NFR BLD AUTO: 9.8 % (ref 24–44)
MCH RBC QN AUTO: 30.7 PG (ref 27–31)
MCHC RBC AUTO-ENTMCNC: 34.4 G/DL (ref 32–36)
MCV RBC AUTO: 89.2 FL (ref 80–99)
MONOCYTES # BLD AUTO: 0.99 10*3/MM3 (ref 0–1)
MONOCYTES NFR BLD AUTO: 6.7 % (ref 0–12)
NEUTROPHILS # BLD AUTO: 12.28 10*3/MM3 (ref 1.5–8.3)
NEUTROPHILS NFR BLD AUTO: 83.3 % (ref 41–71)
NITRITE UR QL STRIP: POSITIVE
PH UR STRIP.AUTO: <=5 [PH] (ref 5–8)
PLATELET # BLD AUTO: 259 10*3/MM3 (ref 150–450)
PMV BLD AUTO: 10.2 FL (ref 6–12)
POTASSIUM BLD-SCNC: 4.2 MMOL/L (ref 3.5–5.5)
PROCALCITONIN SERPL-MCNC: 0.07 NG/ML
PROT SERPL-MCNC: 7.5 G/DL (ref 5.7–8.2)
PROT UR QL STRIP: ABNORMAL
RBC # BLD AUTO: 4.72 10*6/MM3 (ref 4.2–5.76)
RBC # UR: NORMAL /HPF
REF LAB TEST METHOD: NORMAL
SODIUM BLD-SCNC: 135 MMOL/L (ref 132–146)
SP GR UR STRIP: 1.04 (ref 1–1.03)
SQUAMOUS #/AREA URNS HPF: NORMAL /HPF
UROBILINOGEN UR QL STRIP: ABNORMAL
WBC NRBC COR # BLD: 14.75 10*3/MM3 (ref 3.5–10.8)
WBC UR QL AUTO: NORMAL /HPF

## 2019-04-02 PROCEDURE — 25010000002 HYDROMORPHONE PER 4 MG: Performed by: EMERGENCY MEDICINE

## 2019-04-02 PROCEDURE — 85025 COMPLETE CBC W/AUTO DIFF WBC: CPT | Performed by: PHYSICIAN ASSISTANT

## 2019-04-02 PROCEDURE — 25010000002 KETOROLAC TROMETHAMINE PER 15 MG: Performed by: INTERNAL MEDICINE

## 2019-04-02 PROCEDURE — 81001 URINALYSIS AUTO W/SCOPE: CPT | Performed by: PHYSICIAN ASSISTANT

## 2019-04-02 PROCEDURE — 25010000002 ONDANSETRON PER 1 MG: Performed by: PHYSICIAN ASSISTANT

## 2019-04-02 PROCEDURE — 96365 THER/PROPH/DIAG IV INF INIT: CPT

## 2019-04-02 PROCEDURE — 25010000002 HYDROMORPHONE PER 4 MG: Performed by: INTERNAL MEDICINE

## 2019-04-02 PROCEDURE — 84145 PROCALCITONIN (PCT): CPT | Performed by: INTERNAL MEDICINE

## 2019-04-02 PROCEDURE — 25010000002 HYDROMORPHONE 1 MG/ML SOLUTION: Performed by: EMERGENCY MEDICINE

## 2019-04-02 PROCEDURE — 80053 COMPREHEN METABOLIC PANEL: CPT | Performed by: PHYSICIAN ASSISTANT

## 2019-04-02 PROCEDURE — 25010000002 CEFTRIAXONE PER 250 MG: Performed by: INTERNAL MEDICINE

## 2019-04-02 PROCEDURE — 87491 CHLMYD TRACH DNA AMP PROBE: CPT | Performed by: INTERNAL MEDICINE

## 2019-04-02 PROCEDURE — 87591 N.GONORRHOEAE DNA AMP PROB: CPT | Performed by: INTERNAL MEDICINE

## 2019-04-02 PROCEDURE — 83690 ASSAY OF LIPASE: CPT | Performed by: PHYSICIAN ASSISTANT

## 2019-04-02 PROCEDURE — 96376 TX/PRO/DX INJ SAME DRUG ADON: CPT

## 2019-04-02 PROCEDURE — G0378 HOSPITAL OBSERVATION PER HR: HCPCS

## 2019-04-02 PROCEDURE — 76870 US EXAM SCROTUM: CPT

## 2019-04-02 PROCEDURE — 96375 TX/PRO/DX INJ NEW DRUG ADDON: CPT

## 2019-04-02 PROCEDURE — 93976 VASCULAR STUDY: CPT

## 2019-04-02 PROCEDURE — 99284 EMERGENCY DEPT VISIT MOD MDM: CPT

## 2019-04-02 PROCEDURE — 85652 RBC SED RATE AUTOMATED: CPT | Performed by: INTERNAL MEDICINE

## 2019-04-02 PROCEDURE — 99243 OFF/OP CNSLTJ NEW/EST LOW 30: CPT | Performed by: INTERNAL MEDICINE

## 2019-04-02 PROCEDURE — 74176 CT ABD & PELVIS W/O CONTRAST: CPT

## 2019-04-02 PROCEDURE — 83605 ASSAY OF LACTIC ACID: CPT | Performed by: PHYSICIAN ASSISTANT

## 2019-04-02 PROCEDURE — 25010000002 KETOROLAC TROMETHAMINE PER 15 MG: Performed by: PHYSICIAN ASSISTANT

## 2019-04-02 PROCEDURE — 25010000002 ONDANSETRON PER 1 MG: Performed by: EMERGENCY MEDICINE

## 2019-04-02 RX ORDER — PRASUGREL 10 MG/1
10 TABLET, FILM COATED ORAL DAILY
Qty: 30 TABLET
Start: 2019-04-02

## 2019-04-02 RX ORDER — HYDROMORPHONE HYDROCHLORIDE 1 MG/ML
0.5 INJECTION, SOLUTION INTRAMUSCULAR; INTRAVENOUS; SUBCUTANEOUS ONCE
Status: COMPLETED | OUTPATIENT
Start: 2019-04-02 | End: 2019-04-02

## 2019-04-02 RX ORDER — AZITHROMYCIN 250 MG/1
1000 TABLET, FILM COATED ORAL ONCE
Status: COMPLETED | OUTPATIENT
Start: 2019-04-02 | End: 2019-04-02

## 2019-04-02 RX ORDER — ONDANSETRON 2 MG/ML
4 INJECTION INTRAMUSCULAR; INTRAVENOUS ONCE
Status: COMPLETED | OUTPATIENT
Start: 2019-04-02 | End: 2019-04-02

## 2019-04-02 RX ORDER — CEFTRIAXONE SODIUM 1 G/50ML
1 INJECTION, SOLUTION INTRAVENOUS ONCE
Status: COMPLETED | OUTPATIENT
Start: 2019-04-02 | End: 2019-04-02

## 2019-04-02 RX ORDER — KETOROLAC TROMETHAMINE 15 MG/ML
15 INJECTION, SOLUTION INTRAMUSCULAR; INTRAVENOUS ONCE
Status: COMPLETED | OUTPATIENT
Start: 2019-04-02 | End: 2019-04-02

## 2019-04-02 RX ORDER — HYDROCODONE BITARTRATE AND ACETAMINOPHEN 7.5; 325 MG/1; MG/1
1 TABLET ORAL EVERY 6 HOURS PRN
Qty: 16 TABLET | Refills: 0 | Status: SHIPPED | OUTPATIENT
Start: 2019-04-02

## 2019-04-02 RX ORDER — SODIUM CHLORIDE 9 MG/ML
75 INJECTION, SOLUTION INTRAVENOUS CONTINUOUS
Status: DISCONTINUED | OUTPATIENT
Start: 2019-04-02 | End: 2019-04-02

## 2019-04-02 RX ORDER — HYDROCODONE BITARTRATE AND ACETAMINOPHEN 7.5; 325 MG/1; MG/1
1 TABLET ORAL EVERY 6 HOURS PRN
Status: DISCONTINUED | OUTPATIENT
Start: 2019-04-02 | End: 2019-04-02 | Stop reason: HOSPADM

## 2019-04-02 RX ORDER — ASPIRIN 81 MG/1
81 TABLET ORAL DAILY
Start: 2019-04-02

## 2019-04-02 RX ADMIN — CEFTRIAXONE SODIUM 1 G: 1 INJECTION, SOLUTION INTRAVENOUS at 15:35

## 2019-04-02 RX ADMIN — AZITHROMYCIN 1000 MG: 250 TABLET, FILM COATED ORAL at 15:28

## 2019-04-02 RX ADMIN — HYDROMORPHONE HYDROCHLORIDE 0.5 MG: 1 INJECTION, SOLUTION INTRAMUSCULAR; INTRAVENOUS; SUBCUTANEOUS at 11:24

## 2019-04-02 RX ADMIN — HYDROCODONE BITARTRATE AND ACETAMINOPHEN 1 TABLET: 7.5; 325 TABLET ORAL at 15:30

## 2019-04-02 RX ADMIN — HYDROMORPHONE HYDROCHLORIDE 0.5 MG: 1 INJECTION, SOLUTION INTRAMUSCULAR; INTRAVENOUS; SUBCUTANEOUS at 15:33

## 2019-04-02 RX ADMIN — ONDANSETRON 4 MG: 2 INJECTION INTRAMUSCULAR; INTRAVENOUS at 09:03

## 2019-04-02 RX ADMIN — KETOROLAC TROMETHAMINE 15 MG: 15 INJECTION, SOLUTION INTRAMUSCULAR; INTRAVENOUS at 15:30

## 2019-04-02 RX ADMIN — HYDROMORPHONE HYDROCHLORIDE 0.5 MG: 1 INJECTION, SOLUTION INTRAMUSCULAR; INTRAVENOUS; SUBCUTANEOUS at 09:24

## 2019-04-02 RX ADMIN — HYDROMORPHONE HYDROCHLORIDE 1 MG: 1 INJECTION, SOLUTION INTRAMUSCULAR; INTRAVENOUS; SUBCUTANEOUS at 12:31

## 2019-04-02 RX ADMIN — KETOROLAC TROMETHAMINE 15 MG: 15 INJECTION, SOLUTION INTRAMUSCULAR; INTRAVENOUS at 09:03

## 2019-04-02 RX ADMIN — ONDANSETRON 4 MG: 2 INJECTION INTRAMUSCULAR; INTRAVENOUS at 12:30

## 2019-04-02 RX ADMIN — SODIUM CHLORIDE 2000 ML: 9 INJECTION, SOLUTION INTRAVENOUS at 09:05

## 2019-04-02 NOTE — CONSULTS
"     [facility] Medicine Services  CONSULT NOTE      Patient Name: Kenneth Aguilar  : 1980  MRN: [id number]    Primary Care Physician: Shawna Holly MD  Provider requesting consultation: No ref. provider found    Subjective   Subjective     Reason for Consultation:  Groin pain    HPI:  Kenneth Aguilar is a 38 y.o. male w/ hx obesity, cad (remote stent 4 years ago), htn, chronic back pain, anxiety/depression who underwent right inguinal hernia repair 19 in [place] by Dr. Acuña. Patient states he did well after surgery. Patient states he has had right groin/testicular pain over past 6-7 days or so, no fever, denies penile drainage. + nausea but no vomiting. States he did have some diarrhea about 4 days ago but has since resolved. Able to eat and drink. Patient denies seeing any physicians or seeking medical care within the past 2-3 weeks, however, [facility] report indicates lortab scripts on 3/14/19 and 3/21/19. Patient is accompanied by his wife and she states that she tried to get patient to go to local ED but he wished to present to [facility] ED to be evaluated because he is \"tired of nobody being able to tell me what's wrong\". In The ER wbc 14,000. Otherwise labwork ok with normal procalcitonin and lactic acid. Ct a/p revealed no hernias, no intraabdominal or pelvic or genital abnormalities, and testicular u/s w/ doppler was also normal. Was given toradol and dilaudid and hospitalist service was consulted for further evaluation and possible admission.       Review of Systems  No headache, no rash, no penile drainage. No fever. No n/v    Otherwise complete ROS reviewed and is negative except as mentioned in the HPI.    Past Medical History:   Diagnosis Date   • Cholelithiasis    • Coronary artery disease    • Diverticulosis    • Elevated homocysteine (CMS/HCC)    • Fractures    • Gall stones    • Heart attack (CMS/HCC) 2016   • Heart attack (CMS/HCC)    • Kidney " stone    • Kidney stones    • MI, old    • Myocardial infarct (CMS/[facility]) 06/2016    stent x 2   • Obesity    • Shingles 2015       Past Surgical History:   Procedure Laterality Date   • APPENDECTOMY  2007   • CARDIAC CATHETERIZATION     • CARDIAC CATHETERIZATION  06/2016    Xience Alpine stent in diagonal and LAD   • CARDIAC CATHETERIZATION N/A 4/19/2017    Procedure: Left Heart Cath;  Surgeon: Renny Wilson MD;  Location:  [facility];  Service:    • CARDIAC CATHETERIZATION N/A 4/20/2017    Procedure: Angioplasty-coronary;  Surgeon: Renny Wilson MD;  Location:  [facility];  Service:    • CARDIAC CATHETERIZATION N/A 5/15/2017    Procedure: Left Heart Cath;  Surgeon: Renny Wilson MD;  Location:  [facility];  Service:    • CHOLECYSTECTOMY  2005   • COLONOSCOPY  2014   • CORONARY STENT PLACEMENT  06/20/2016    2 stents   • FINGER FUSION Left     thumb   • HERNIA REPAIR  2013   • INTERVENTIONAL RADIOLOGY PROCEDURE N/A 4/20/2017    Procedure: Intravascular Ultrasound;  Surgeon: Renny Wilson MD;  Location:  [facility];  Service:    • KIDNEY STONE SURGERY      STONES REMOVED 2006, 2007, 2008       Family History: family history includes Arthritis in his mother; Cancer in his maternal aunt, maternal uncle, and paternal uncle; Diabetes in his father and mother; Heart attack in his brother, father, and mother; Hypertension in his father. Otherwise pertinent FHx was reviewed and unremarkable.     Social History:  reports that he has been smoking cigarettes.  He has a 8.50 pack-year smoking history. He has quit using smokeless tobacco. He reports that he drinks alcohol. He reports that he does not use drugs.    Medications:    (Not in a hospital admission)      Current Facility-Administered Medications:   •  azithromycin (ZITHROMAX) tablet 1,000 mg, 1,000 mg, Oral, Once, Virgil May MD  •   cefTRIAXone (ROCEPHIN) IVPB 1 g, 1 g, Intravenous, Once, Virgil May MD  •  HYDROcodone-acetaminophen (NORCO) 7.5-325 MG per tablet 1 tablet, 1 tablet, Oral, Q6H PRN, Virgil May MD  •  HYDROmorphone (DILAUDID) injection 0.5 mg, 0.5 mg, Intravenous, Once, Virgil May MD  •  ketorolac (TORADOL) injection 15 mg, 15 mg, Intravenous, Once, Virgil May MD  •  sodium chloride 0.9 % infusion, 75 mL/hr, Intravenous, Continuous, Virgil May MD    Current Outpatient Medications:   •  amitriptyline (ELAVIL) 10 MG tablet, Take 2 tablets by mouth Daily., Disp: 180 tablet, Rfl: 3  •  aspirin 81 MG EC tablet, Take 1 tablet by mouth Daily., Disp: , Rfl:   •  atorvastatin (LIPITOR) 40 MG tablet, Take 1 tablet by mouth Every Night., Disp: 90 tablet, Rfl: 3  •  clonazePAM (KlonoPIN) 0.5 MG tablet, Take 1 tablet by mouth 2 (Two) Times a Day As Needed for Anxiety., Disp: 30 tablet, Rfl: 1  •  diclofenac (VOLTAREN) 50 MG EC tablet, Take 1 tablet by mouth 2 (Two) Times a Day As Needed (pain)., Disp: 20 tablet, Rfl: 0  •  dicyclomine (BENTYL) 20 MG tablet, Take 1 tablet by mouth Every 6 (Six) Hours As Needed (abdominal cramping)., Disp: 12 tablet, Rfl: 0  •  furosemide (LASIX) 20 MG tablet, 1-2 tablets as needed for swelling., Disp: 180 tablet, Rfl: 3  •  gabapentin (NEURONTIN) 300 MG capsule, Take 1 capsule by mouth 3 (Three) Times a Day., Disp: 270 capsule, Rfl: 1  •  HYDROcodone-acetaminophen (NORCO) 7.5-325 MG per tablet, Take 1 tablet by mouth Every 6 (Six) Hours As Needed for Moderate Pain  for up to 16 doses., Disp: 16 tablet, Rfl: 0  •  lisinopril (PRINIVIL,ZESTRIL) 2.5 MG tablet, Take 1 tablet by mouth Daily., Disp: 90 tablet, Rfl: 3  •  metoclopramide (REGLAN) 5 MG tablet, Take 1 tablet by mouth 3 (Three) Times a Day., Disp: 270 tablet, Rfl: 3  •  metoprolol succinate XL (TOPROL-XL) 25 MG 24 hr tablet, Take 1 tablet by mouth Daily., Disp: 90 tablet, Rfl: 3  •  nitroglycerin (NITROSTAT)  0.4 MG SL tablet, Place 1 tablet under the tongue Every 5 (Five) Minutes As Needed for Chest Pain. Take no more than 3 doses in 15 minutes., Disp: 100 tablet, Rfl: 12  •  ondansetron ODT (ZOFRAN-ODT) 4 MG disintegrating tablet, Take 1 tablet by mouth Every 6 (Six) Hours As Needed for Nausea or Vomiting., Disp: 30 tablet, Rfl: 1  •  pantoprazole (PROTONIX) 40 MG EC tablet, Take 1 tablet by mouth Daily., Disp: 90 tablet, Rfl: 3  •  potassium chloride (K-DUR,KLOR-CON) 20 MEQ CR tablet, Take 1 tablet by mouth Daily., Disp: 90 tablet, Rfl: 3  •  prasugrel (EFFIENT) 10 MG tablet, Take 1 tablet by mouth Daily. Home medicine, continue, Disp: 30 tablet, Rfl:   •  promethazine (PHENERGAN) 25 MG tablet, Take 1 tablet by mouth Every 6 (Six) Hours As Needed for Nausea or Vomiting., Disp: 12 tablet, Rfl: 0    Allergies   Allergen Reactions   • Wasp Venom Swelling     Local swelling       Objective   Objective     Vital Signs:   Temp:  [98.2 °F (36.8 °C)] 98.2 °F (36.8 °C)  Heart Rate:  [] 83  Resp:  [16-20] 20  BP: (102-158)/() 126/85     Physical Exam  Nontoxic, ox4, obese, initially met in hallway walking without difficulty.   Ncat, oroph clear  rrr  ctab  abd soft, mild tenderness suprapubic/right groin/inguinal area.no rebound, no guarding  Right testicle tender to palpation, no mass noted. No hernia appreciated. No redness, no rash on penis.   No cce  No extremity rash    Results Reviewed:  I have personally reviewed current lab, radiology, and data and agree.    Results from last 7 days   Lab Units 04/02/19  0906   WBC 10*3/mm3 14.75*   HEMOGLOBIN g/dL 14.5   HEMATOCRIT % 42.1   PLATELETS 10*3/mm3 259     Results from last 7 days   Lab Units 04/02/19  0906   SODIUM mmol/L 135   POTASSIUM mmol/L 4.2   CHLORIDE mmol/L 103   CO2 mmol/L 23.0   BUN mg/dL 19   CREATININE mg/dL 0.76   GLUCOSE mg/dL 124*   CALCIUM mg/dL 10.3   ALT (SGPT) U/L 18   AST (SGOT) U/L 18     Estimated Creatinine Clearance: 187.5 mL/min (by C-G  formula based on SCr of 0.76 mg/dL).  Brief Urine Lab Results  (Last result in the past 365 days)      Color   Clarity   Blood   Leuk Est   Nitrite   Protein   CREAT   Urine HCG        04/02/19 1150 Dark Yellow Clear Negative Small (1+) Positive Trace             No results found for: BNP    Microbiology Results Abnormal     None          Imaging Results (last 24 hours)     Procedure Component Value Units Date/Time    US Testicular or Ovarian Vascular Limited [[id number]] Collected:  04/02/19 1321     Updated:  04/02/19 1337    Narrative:       EXAMINATION: US SCROTUM AND TESTICLES-, US TESTICULAR OR OVARIAN  VASCULAR LIMITED- 04/02/2019      INDICATION: pain     TECHNIQUE:  Multiplanar gray scale, spectral Doppler and color Doppler  sonographic images of the scrotum and its contents were obtained.     COMPARISONS:  None.     FINDINGS:       Right: Right testicle is normal in size and echogenicity measuring 3.4 x  1.7 x 2.7 cm.  The epididymis is normal. Trace hydrocele. Arterial and  venous waveforms are identified with spectral Doppler analysis.     Left:  Left testicle is normal in size and echogenicity measuring 4.1 x  1.5 x 2.9 cm.  The epididymis is normal. Trace hydrocele. Arterial and  venous waveforms are identified with spectral Doppler analysis.     Color Doppler images of both testicles show symmetric flow related  signal.       Impression:       No evidence of torsion or epididymoorchitis.     D:  04/02/2019  E:  04/02/2019           This report was finalized on 4/2/2019 1:34 PM by Nima Nelson.       US Scrotum & Testicles [[id number]] Collected:  04/02/19 1321     Updated:  04/02/19 1337    Narrative:       EXAMINATION: US SCROTUM AND TESTICLES-, US TESTICULAR OR OVARIAN  VASCULAR LIMITED- 04/02/2019      INDICATION: pain     TECHNIQUE:  Multiplanar gray scale, spectral Doppler and color Doppler  sonographic images of the scrotum and its contents were obtained.     COMPARISONS:  None.     FINDINGS:        Right: Right testicle is normal in size and echogenicity measuring 3.4 x  1.7 x 2.7 cm.  The epididymis is normal. Trace hydrocele. Arterial and  venous waveforms are identified with spectral Doppler analysis.     Left:  Left testicle is normal in size and echogenicity measuring 4.1 x  1.5 x 2.9 cm.  The epididymis is normal. Trace hydrocele. Arterial and  venous waveforms are identified with spectral Doppler analysis.     Color Doppler images of both testicles show symmetric flow related  signal.       Impression:       No evidence of torsion or epididymoorchitis.     D:  04/02/2019  E:  04/02/2019           This report was finalized on 4/2/2019 1:34 PM by Nima Nelson.       CT Abdomen Pelvis Without Contrast [[id number]] Collected:  04/02/19 1036     Updated:  04/02/19 1042    Narrative:       EXAMINATION: CT ABDOMEN PELVIS WO CONTRAST- 04/02/2019      INDICATION: RLQ and right inguinal pain;  N/V     TECHNIQUE:  Axial CT data of the abdomen and pelvis were obtained  helically without IV contrast.  Multiplanar reformatted images were  generated and reviewed. The radiation dose reduction device was turned  on for each scan per the ALARA (As Low as Reasonably Achievable)  protocol     COMPARISONS:  08/12/2018     FINDINGS: There has been prior cholecystectomy and appendectomy. There  is no dilated small or large bowel. There is no free fluid, free air or  enlarged lymph node. The prostate and seminal vesicles, kidneys, adrenal  glands, pancreas and spleen are unremarkable in unenhanced CT  appearance. The lung bases are clear. The body wall soft tissues are  unremarkable; specifically, there is no body wall hernia. There is  lumbar spine degenerative change including concern for bilateral L5 pars  defects and anterolisthesis of L5 with respect to S1.       Impression:       No acute finding in the abdomen or pelvis.     D:  04/02/2019  E:  04/02/2019     This report was finalized on 4/2/2019 10:39 AM by Nima  "Omar.                Assessment/Plan   Assessment / Plan     Active Hospital Problems    Diagnosis POA   • **Epididymitis [N45.1] Unknown   • Intractable pain [R52] Yes     Priority: High   • Leukocytosis [D72.829] Unknown   • History of inguinal hernia [Z87.19] Not Applicable   • Essential hypertension [I10] Yes   • Coronary artery disease involving native coronary artery of native heart with angina pectoris (CMS/HCC) [I25.119] Yes     · Cardiac catheterization for inferior lateral STEMI (6/20/16): 100% occlusion of the proximal LAD status post bifurcation stenting of the LAD/diagonal branch using a 2.5 x 18 mm stent in the LAD and a 3 x 15 mm stent in the diagonal branch.  · \"Relook\" cardiac catheterization recurrent chest pain by [facility]ers (6/20/2016):  Patent stents in LAD and diagonal branch.  No obstructive disease PCI to unknown artery  · [facility] ED presentation with severe persistent chest pain with negative EKG and biomarkers, 4/19/17  · Cardiac catheterization for unstable angina (4/19/17): Patent LAD/diagonal bifurcation stents.  OCT of the diagonal showed excellent stent apposition.  OCT of the LAD desired, but LAD develops MAYRA 1 flow with wire across lesion.  · Staged kissing balloon angioplasty to the LAD/diagonal branch, 4/20/17  · Echocardiogram at [facility] (5/14/2017): Mildly reduced EF 40-45%.  Mid to distal anterior and apical hypokinesis.  No valvular abnormality  · Relook angiography (5/15/17): Widely patent LAD/diagonal with widely patent stents.  LVEF 40%     • Anxiety [F41.9] Yes   • Tobacco abuse [Z72.0] Yes   • Lumbar radicular pain [M54.16] Yes       Plan/Discussion:  -I reviewed ct a/p and testicular ultrasound with Dr. Nelson (radiologist). There is no bowel or hernia, no sign colitis, the testicles look normal, prostate looks normal. On examination right testicular tenderness w/ palpation and no hernia palpable. procalcitonin normal. Lactic acid normal. Afebilre. I was asked by ED to consider " hospital admission. After evaluating patient I will give rocephin 1g, zithromax 1g po to treat epididymitis empirically. I gave patient option to be admitted for further observation and pain control over night versus discharge home with close follow up with pcp. The patient has opted for discharge home. He understands that if he develops fever, n/v/inability to keep down food or worsening symptoms that he is to go to nearest emergency room and he (and his wife) voice understanding.    Electronically signed by Virgil May MD, 04/02/19, 3:14 PM.

## 2019-04-02 NOTE — DISCHARGE INSTRUCTIONS
Follow up with PCP in one week  Follow up with Dr Acuña in one week if still experiencing groin pain

## 2019-04-02 NOTE — ED PROVIDER NOTES
Subjective   Kenneth Aguilar is a 38 y.o.male who presents to the ED with complaints of nausea, vomiting, right groin pain and right leg weakness. Mr. Aguilar states that he has been vomiting 1-2 times an hour for the past 2 days and now is retching. Since then, he complains of severe pain in his right groin and RLQ region which is where he had a right inguinal hernia surgery in February. He also has mild pain in his right testicle. He says the pain radiates into his upper right leg and says that his right leg has been weak. When he stands for too long his leg gives out on him. He reports having diarrhea 3 days ago which has since resolved. He was had been intermittently dizzy prior to having diarrhea and has had increasing fatigue. He says he has had a subjective fever, but does not have a thermometer at home. Occasionally, he feels the urge to urinate but either has difficulty voiding a lot of urine or any urine at all. He denies any dysuria or hematuria. He denies any chest pain or cough. He had a MI 3-4 years ago and had 2 stents placed. He smokes a pack of cigarettes a day and rarely uses alcohol but denies drug use. His surgical history is significant for an appendectomy and cholecystectomy. There are no other acute complaints at this time.        History provided by:  Patient  Vomiting   The primary symptoms include fever (subjective), fatigue, abdominal pain, nausea, vomiting and diarrhea. Primary symptoms do not include dysuria. The illness began 3 to 5 days ago. The onset was gradual. The problem has been gradually worsening.   Associated medical issues do not include alcohol abuse.       Review of Systems   Constitutional: Positive for fatigue and fever (subjective).   Respiratory: Negative for cough.    Cardiovascular: Negative for chest pain.   Gastrointestinal: Positive for abdominal pain, diarrhea, nausea and vomiting.   Genitourinary: Positive for difficulty urinating. Negative for dysuria and  hematuria.        Right groin pain   Neurological: Positive for dizziness and weakness (right leg).   All other systems reviewed and are negative.      Past Medical History:   Diagnosis Date   • Cholelithiasis    • Coronary artery disease    • Diverticulosis    • Elevated homocysteine (CMS/HCC)    • Fractures    • Gall stones    • Heart attack (CMS/HCC) 06/20/2016   • Heart attack (CMS/HCC)    • Kidney stone    • Kidney stones    • MI, old    • Myocardial infarct (CMS/HCC) 06/2016    stent x 2   • Obesity    • Shingles 2015       Allergies   Allergen Reactions   • Wasp Venom Swelling     Local swelling       Past Surgical History:   Procedure Laterality Date   • APPENDECTOMY  2007   • CARDIAC CATHETERIZATION     • CARDIAC CATHETERIZATION  06/2016    Xience Alpine stent in diagonal and LAD   • CARDIAC CATHETERIZATION N/A 4/19/2017    Procedure: Left Heart Cath;  Surgeon: Renny Wilson MD;  Location:  [facility];  Service:    • CARDIAC CATHETERIZATION N/A 4/20/2017    Procedure: Angioplasty-coronary;  Surgeon: Renny Wilson MD;  Location:  [facility];  Service:    • CARDIAC CATHETERIZATION N/A 5/15/2017    Procedure: Left Heart Cath;  Surgeon: Renny Wilson MD;  Location:  [facility];  Service:    • CHOLECYSTECTOMY  2005   • COLONOSCOPY  2014   • CORONARY STENT PLACEMENT  06/20/2016    2 stents   • FINGER FUSION Left     thumb   • HERNIA REPAIR  2013   • INTERVENTIONAL RADIOLOGY PROCEDURE N/A 4/20/2017    Procedure: Intravascular Ultrasound;  Surgeon: Rneny Wilson MD;  Location:  [facility];  Service:    • KIDNEY STONE SURGERY      STONES REMOVED 2006, 2007, 2008       Family History   Problem Relation Age of Onset   • Arthritis Mother    • Heart attack Mother         30s   • Heart attack Father         30s   • Hypertension Father    • Heart attack Brother         30s   • Cancer Maternal Aunt    •  Cancer Maternal Uncle    • Cancer Paternal Uncle    • Diabetes Mother    • Diabetes Father        Social History     Socioeconomic History   • Marital status:      Spouse name: Not on file   • Number of children: Not on file   • Years of education: Not on file   • Highest education level: Not on file   Tobacco Use   • Smoking status: Current Every Day Smoker     Packs/day: 0.50     Years: 17.00     Pack years: 8.50     Types: Cigarettes     Last attempt to quit: 7/15/2016     Years since quittin.7   • Smokeless tobacco: Former User   Substance and Sexual Activity   • Alcohol use: Yes     Comment: OCCASIONAL   • Drug use: No   • Sexual activity: Defer   Social History Narrative    ** Merged History Encounter **              Objective   Physical Exam   Constitutional: He is oriented to person, place, and time. He appears well-developed and well-nourished. No distress.   HENT:   Head: Normocephalic and atraumatic.   Nose: Nose normal.   Eyes: Conjunctivae are normal. No scleral icterus.   Neck: Normal range of motion. Neck supple.   Cardiovascular: Regular rhythm and normal heart sounds. Tachycardia present.   No murmur heard.  Mildly tachycardic.    Pulmonary/Chest: Effort normal and breath sounds normal. No respiratory distress.   Abdominal: Soft. Bowel sounds are normal. There is tenderness. No hernia. Hernia confirmed negative in the right inguinal area and confirmed negative in the left inguinal area.   He has moderate right lower quadrant tenderness and tenderness in the right inguinal regions. No palpable hernias.    Genitourinary: Testes normal.   Genitourinary Comments: Normal testicular exam.    Musculoskeletal: Normal range of motion. He exhibits no edema.   Neurological: He is alert and oriented to person, place, and time.   Skin: Skin is warm and dry.   Psychiatric: He has a normal mood and affect. His behavior is normal.   Nursing note and vitals reviewed.      Procedures         ED Course       1:52 PM  The patient continues to writhe about and grimace with pain in spite of multiple doses of IV Dilaudid.  White count is elevated at 14.75.  No other concerning labs.  Urinalysis shows nitrite positive and leukocyte esterase positive but only 0-2 white cells and no bacteria.  CT of the abdomen and pelvis shows no acute abnormality.  Ultrasound of the right testicle shows no evidence of torsion or other acute process.  I am at a loss as to the cause of his severe pain.  Perhaps he has a inguinal strain secondary to his recent vomiting.  A [facility] report shows several recent scripts for narcotic pain meds but overall, does not look to be a pattern of abuse.  The pt states he can't go home like this.  I will talk with the hospitalist for admission for intractable pain.    Recent Results (from the past 24 hour(s))   Comprehensive Metabolic Panel    Collection Time: 04/02/19  9:06 AM   Result Value Ref Range    Glucose 124 (H) 70 - 100 mg/dL    BUN 19 9 - 23 mg/dL    Creatinine 0.76 0.60 - 1.30 mg/dL    Sodium 135 132 - 146 mmol/L    Potassium 4.2 3.5 - 5.5 mmol/L    Chloride 103 99 - 109 mmol/L    CO2 23.0 20.0 - 31.0 mmol/L    Calcium 10.3 8.7 - 10.4 mg/dL    Total Protein 7.5 5.7 - 8.2 g/dL    Albumin 4.73 3.20 - 4.80 g/dL    ALT (SGPT) 18 7 - 40 U/L    AST (SGOT) 18 0 - 33 U/L    Alkaline Phosphatase 78 25 - 100 U/L    Total Bilirubin 0.5 0.3 - 1.2 mg/dL    eGFR Non African Amer 115 >60 mL/min/1.73    Globulin 2.8 gm/dL    A/G Ratio 1.7 1.5 - 2.5 g/dL    BUN/Creatinine Ratio 25.0 7.0 - 25.0    Anion Gap 9.0 3.0 - 11.0 mmol/L   Lipase    Collection Time: 04/02/19  9:06 AM   Result Value Ref Range    Lipase 28 6 - 51 U/L   CBC Auto Differential    Collection Time: 04/02/19  9:06 AM   Result Value Ref Range    WBC 14.75 (H) 3.50 - 10.80 10*3/mm3    RBC 4.72 4.20 - 5.76 10*6/mm3    Hemoglobin 14.5 13.1 - 17.5 g/dL    Hematocrit 42.1 38.9 - 50.9 %    MCV 89.2 80.0 - 99.0 fL    MCH 30.7 27.0 - 31.0 pg    MCHC 34.4  32.0 - 36.0 g/dL    RDW 14.1 11.3 - 14.5 %    RDW-SD 46.1 37.0 - 54.0 fl    MPV 10.2 6.0 - 12.0 fL    Platelets 259 150 - 450 10*3/mm3    Neutrophil % 83.3 (H) 41.0 - 71.0 %    Lymphocyte % 9.8 (L) 24.0 - 44.0 %    Monocyte % 6.7 0.0 - 12.0 %    Eosinophil % 0.1 0.0 - 3.0 %    Basophil % 0.1 0.0 - 1.0 %    Immature Grans % 0.2 0.0 - 0.6 %    Neutrophils, Absolute 12.28 (H) 1.50 - 8.30 10*3/mm3    Lymphocytes, Absolute 1.44 0.60 - 4.80 10*3/mm3    Monocytes, Absolute 0.99 0.00 - 1.00 10*3/mm3    Eosinophils, Absolute 0.02 0.00 - 0.30 10*3/mm3    Basophils, Absolute 0.02 0.00 - 0.20 10*3/mm3    Immature Grans, Absolute 0.03 0.00 - 0.05 10*3/mm3   Urinalysis With Microscopic If Indicated (No Culture) - Urine, Clean Catch    Collection Time: 04/02/19 11:50 AM   Result Value Ref Range    Color, UA Dark Yellow (A) Yellow, Straw    Appearance, UA Clear Clear    pH, UA <=5.0 5.0 - 8.0    Specific Gravity, UA 1.044 (H) 1.001 - 1.030    Glucose, UA Negative Negative    Ketones, UA 15 mg/dL (1+) (A) Negative    Bilirubin, UA Moderate (2+) (A) Negative    Blood, UA Negative Negative    Protein, UA Trace (A) Negative    Leuk Esterase, UA Small (1+) (A) Negative    Nitrite, UA Positive (A) Negative    Urobilinogen, UA 1.0 E.U./dL 0.2 - 1.0 E.U./dL   Urinalysis, Microscopic Only - Urine, Clean Catch    Collection Time: 04/02/19 11:50 AM   Result Value Ref Range    RBC, UA 0-2 None Seen, 0-2 /HPF    WBC, UA 0-2 None Seen, 0-2 /HPF    Bacteria, UA None Seen None Seen, Trace /HPF    Squamous Epithelial Cells, UA 0-2 None Seen, 0-2 /HPF    Hyaline Casts, UA 0-6 0 - 6 /LPF    Methodology Automated Microscopy      Note: In addition to lab results from this visit, the labs listed above may include labs taken at another facility or during a different encounter within the last 24 hours. Please correlate lab times with ED admission and discharge times for further clarification of the services performed during this visit.    US Testicular or  Ovarian Vascular Limited   Final Result   No evidence of torsion or epididymoorchitis.       D:  04/02/2019   E:  04/02/2019               This report was finalized on 4/2/2019 1:34 PM by Nima Nelson.          US Scrotum & Testicles   Final Result   No evidence of torsion or epididymoorchitis.       D:  04/02/2019   E:  04/02/2019               This report was finalized on 4/2/2019 1:34 PM by Nima Nelson.          CT Abdomen Pelvis Without Contrast   Final Result   No acute finding in the abdomen or pelvis.       D:  04/02/2019   E:  04/02/2019       This report was finalized on 4/2/2019 10:39 AM by Nima Nelson.            Vitals:    04/02/19 1130 04/02/19 1200 04/02/19 1230 04/02/19 1302   BP: 136/76 142/84 143/79 114/73   BP Location:       Patient Position:       Pulse: 86 84 85 81   Resp: 18 18  18   Temp:       TempSrc:       SpO2: 97% 97% 98% 97%   Weight:       Height:         Medications   sodium chloride 0.9 % bolus 2,000 mL (0 mL Intravenous Stopped 4/2/19 1045)   ondansetron (ZOFRAN) injection 4 mg (4 mg Intravenous Given 4/2/19 0903)   ketorolac (TORADOL) injection 15 mg (15 mg Intravenous Given 4/2/19 0903)   HYDROmorphone (DILAUDID) injection 0.5 mg (0.5 mg Intravenous Given 4/2/19 0924)   HYDROmorphone (DILAUDID) injection 0.5 mg (0.5 mg Intravenous Given 4/2/19 1124)   HYDROmorphone (DILAUDID) injection 1 mg (1 mg Intravenous Given 4/2/19 1231)   ondansetron (ZOFRAN) injection 4 mg (4 mg Intravenous Given 4/2/19 1230)     ECG/EMG Results (last 24 hours)     ** No results found for the last 24 hours. **        No orders to display                         MDM    Final diagnoses:   Intractable pain   Right groin pain   Non-intractable vomiting with nausea, unspecified vomiting type       Documentation assistance provided by harley Lester.  Information recorded by the scribe was done at my direction and has been verified and validated by me.     Bong Lester  04/02/19 0846       Jacob,  NEERAJ Richardson  04/02/19 2199       Wojciech James PA  04/02/19 1118

## 2019-04-04 LAB
C TRACH RRNA SPEC DONR QL NAA+PROBE: NEGATIVE
N GONORRHOEA DNA SPEC QL NAA+PROBE: NEGATIVE

## (undated) DEVICE — CATH DIAG EXPO .056 FL3.5 6F 100CM

## (undated) DEVICE — PK CATH CARD 10

## (undated) DEVICE — RADIFOCUS TORQUE DEVICE MULTI-TORQUE VISE: Brand: RADIFOCUS TORQUE DEVICE

## (undated) DEVICE — ST EXT IV SMARTSITE 2VLV SP M LL 5ML IV1

## (undated) DEVICE — ST ACC MICROPUNCTURE .018 TRANSLSS/SS/TP 5F/10CM 21G/7CM

## (undated) DEVICE — KT VLV HEMO MAP ACC PLS LG/BORE MTL/INTRO W/TORQ/DEV

## (undated) DEVICE — Device

## (undated) DEVICE — GLIDESHEATH BASIC HYDROPHILIC COATED INTRODUCER SHEATH: Brand: GLIDESHEATH

## (undated) DEVICE — MODEL AT P54, P/N 700608-035KIT CONTENTS: HAND CONTROLLER, 3-WAY HIGH-PRESSURE STOPCOCK WITH ROTATING END AND PREMIUM HIGH-PRESSURE TUBING: Brand: ANGIOTOUCH® KIT

## (undated) DEVICE — Device: Brand: ASAHI SION BLUE

## (undated) DEVICE — GC 7F 078 XB 4: Brand: VISTA BRITE TIP

## (undated) DEVICE — CATH IMG DRAGONFLY OPTIS 2.7F 135CM

## (undated) DEVICE — DEV INFL MONARCH 25W

## (undated) DEVICE — GW J TP FIX CORE .035 150

## (undated) DEVICE — DEV COMP RAD PRELUDESYNC 24CM

## (undated) DEVICE — MODEL BT2000 P/N 700287-012KIT CONTENTS: MANIFOLD WITH SALINE AND CONTRAST PORTS, SALINE TUBING WITH SPIKE AND HAND SYRINGE, TRANSDUCER: Brand: BT2000 AUTOMATED MANIFOLD KIT

## (undated) DEVICE — ST INF PRI SMRTSTE 20DRP 2VLV 24ML 117

## (undated) DEVICE — CATH DIAG EXPO M/ PK 6FR FL4/FR4 PIG 3PK

## (undated) DEVICE — ANGIO-SEAL EVOLUTION VASCULAR CLOSURE DEVICE: Brand: ANGIO-SEAL

## (undated) DEVICE — TREK CORONARY DILATATION CATHETER 2.50 MM X 15 MM / RAPID-EXCHANGE: Brand: TREK

## (undated) DEVICE — CANNULA,ADULT,SOFT-TOUCH,7'TUBE,UC: Brand: PENDING

## (undated) DEVICE — GUIDE CATHETER: Brand: MACH1™

## (undated) DEVICE — CATH INTRAVAS ULTRASND EAGLE EYE 2.9FR

## (undated) DEVICE — NC TREK CORONARY DILATATION CATHETER 2.75 MM X 12 MM / RAPID-EXCHANGE: Brand: NC TREK

## (undated) DEVICE — PINNACLE INTRODUCER SHEATH: Brand: PINNACLE